# Patient Record
Sex: FEMALE | Race: WHITE | Employment: OTHER | URBAN - METROPOLITAN AREA
[De-identification: names, ages, dates, MRNs, and addresses within clinical notes are randomized per-mention and may not be internally consistent; named-entity substitution may affect disease eponyms.]

---

## 2017-10-19 ENCOUNTER — HOSPITAL ENCOUNTER (OUTPATIENT)
Dept: MAMMOGRAPHY | Age: 67
Discharge: HOME | End: 2017-10-19
Attending: FAMILY MEDICINE
Payer: MEDICARE

## 2017-10-19 DIAGNOSIS — Z12.31 VISIT FOR SCREENING MAMMOGRAM: ICD-10-CM

## 2017-10-19 PROCEDURE — 77063 BREAST TOMOSYNTHESIS BI: CPT

## 2018-11-03 ENCOUNTER — HOSPITAL ENCOUNTER (OUTPATIENT)
Dept: MAMMOGRAPHY | Age: 68
Discharge: HOME | End: 2018-11-03
Attending: FAMILY MEDICINE
Payer: MEDICARE

## 2018-11-03 DIAGNOSIS — Z12.31 VISIT FOR SCREENING MAMMOGRAM: ICD-10-CM

## 2018-11-03 PROCEDURE — 77063 BREAST TOMOSYNTHESIS BI: CPT

## 2018-11-07 ENCOUNTER — HOSPITAL ENCOUNTER (OUTPATIENT)
Dept: MAMMOGRAPHY | Age: 68
Discharge: HOME | End: 2018-11-07
Attending: FAMILY MEDICINE
Payer: MEDICARE

## 2018-11-07 DIAGNOSIS — R92.8 ABNORMAL SCREENING MAMMOGRAM: ICD-10-CM

## 2018-11-07 PROCEDURE — 76642 ULTRASOUND BREAST LIMITED: CPT

## 2019-12-12 ENCOUNTER — HOSPITAL ENCOUNTER (OUTPATIENT)
Dept: PHYSICAL THERAPY | Age: 69
Discharge: HOME | End: 2019-12-12
Payer: MEDICARE

## 2019-12-12 DIAGNOSIS — M54.32 BILATERAL SCIATICA: ICD-10-CM

## 2019-12-12 DIAGNOSIS — M54.31 BILATERAL SCIATICA: ICD-10-CM

## 2019-12-12 PROCEDURE — 97140 MANUAL THERAPY 1/> REGIONS: CPT

## 2019-12-12 PROCEDURE — 97161 PT EVAL LOW COMPLEX 20 MIN: CPT

## 2019-12-12 PROCEDURE — 97110 THERAPEUTIC EXERCISES: CPT

## 2019-12-12 NOTE — THERAPY EVALUATION
Bob Navarro  : 1950  [place]  Secondary: [address] [facility] at [address], [place], [address] [place] [address]  [phone]   [phone]       OUTPATIENT PHYSICAL THERAPY:Initial Assessment 2019    ICD-10: Treatment Diagnosis: low back pain (M54.5)                Treatment Diagnosis 2: sciatica, right (M54.31)                Treatment Diagnosis 3: sciatica, left (M54.32)  Precautions: numbness and L LE weakness  Allergies: Latex; Ciprofloxacin; and Pcn [penicillins]   TREATMENT PLAN:  Effective Dates: 2019 TO 2/10/2020 (60 days). Frequency/Duration: 1-2 times a week for 60 Day(s) MEDICAL/REFERRING DIAGNOSIS:  Bilateral sciatica [M54.31, M54.32]   DATE OF ONSET: chronic back pain which has been intermittent over quite a few years. L LE pain and worsened low back pain over the last year. REFERRING PHYSICIAN: Ale Mares MD MD Orders: Evaluate and Treat   Return MD Appointment: after physical therapy     INITIAL ASSESSMENT:  Ms. Charley Mayen is a 78 y.o. female presenting to physical therapy with complaints of chronic low back pain which has worsened over the last year. She reports intermittent low back pain for years, but now having L LE pain and R thigh burning since hiking [place]. She was having pain into her L foot until about 2 weeks ago and now reports pain just to her knee. Patient rates her pain 7/10 currently, up to 10/10 at worst with increased activities, and 2/10 at best when not doing much or resting. When her pain is intense she is able to lay down on her back for about 30-40 minutes to get it to relieve some. Patient with difficulty with inclines and stairs. She is eager to return to her prior level of activities, including camping and hiking with her family.  Patient presents with increased pain, decreased strength, decreased ROM, decreased flexibility, impaired gait, impaired posture, impaired transfer ability, decreased activity tolerance, and overall impaired functional mobility. Patient is a good candidate for skilled physical therapy interventions to include manual therapy, therapeutic exercise, balance training, gait training, transfer training, postural re-education, body mechanics training, and pain modalities as needed. PROBLEM LIST (Impacting functional limitations):  1. Decreased Strength  2. Decreased ADL/Functional Activities  3. Decreased Transfer Abilities  4. Decreased Ambulation Ability/Technique  5. Decreased Balance  6. Increased Pain  7. Decreased Activity Tolerance  8. Decreased Pacing Skills  9. Decreased Work Simplification/Energy Conservation Techniques  10. Decreased Flexibility/Joint Mobility INTERVENTIONS PLANNED: (Treatment may consist of any combination of the following)  1. Balance Exercise  2. Bed Mobility  3. Cold  4. Electrical Stimulation  5. Family Education  6. Gait Training  7. Heat  8. Home Exercise Program (HEP)  9. Manual Therapy  10. Neuromuscular Re-education/Strengthening  11. Range of Motion (ROM)  12. Therapeutic Activites  13. Therapeutic Exercise/Strengthening  14. Transfer Training  15. Ultrasound (US)     GOALS: (Goals have been discussed and agreed upon with patient.)  Short-Term Functional Goals: Time Frame: 12/12/2019 to 1/11/19  1. Patient demonstrates independence with home exercise program without verbal cueing provided by therapist.   2. Patient will report no more than 4/10 low back pain at rest in order to demonstrate improved self pain control and tolerance. 3. Patient will be educated in and demonstrate proper squat lift technique in order to decrease strain on her low back with lifting activities in her home. Discharge Goals: Time Frame: 12/12/2019 to 2/10/20  1. Patient will report no more than 4/10 low back pain with ADLs and transfers in order to demonstrate improving activity tolerance and mobility.   2. Patient will be able to sleep through the night without waking due to back pain in order to improve sleeping pattern for overall health and wellness. 3. Patient will be able to walk her dog and go on light hikes with her family with minimal increased back pain in order to return to prior level of activities. 4. Patient will be able to perform stairs with reciprocal pattern on the way up and down in order to demonstrate improved functional LE strength. 5. Patient will improve Modified Oswestry Scale score to 13/50 from 18/50. Outcome Measure: Tool Used: Modified Oswestry Low Back Pain Questionnaire  Score:  Initial: 18/50  Most Recent: X/50 (Date: -- )   Interpretation of Score: Each section is scored on a 0-5 scale, 5 representing the greatest disability. The scores of each section are added together for a total score of 50. Medical Necessity:   · Patient is expected to demonstrate progress in strength, range of motion, balance, coordination and functional technique to improve safety during lifting, housework, and walking. · Skilled intervention continues to be required due to low back pain with L LE pain hindering return to prior level of activities. Reason for Services/Other Comments:  · Patient continues to require skilled intervention due to increased pain with decreased functional mobility. Total Evaluation Duration: 30 minutes    Rehabilitation Potential For Stated Goals: Good  Regarding Elsa Porter's therapy, I certify that the treatment plan above will be carried out by a therapist or under their direction.   Thank you for this referral,  Lotus Silveira PT    Referring Physician Signature: Elías Guardado MD              Date                     PAIN/SUBJECTIVE:    Initial: Pain Intensity 1: 7  Pain Location 1: Back, Leg  Pain Orientation 1: Lower, Left  Post Session:  3/10    HISTORY:    History of Injury/Illness (Reason for Referral):  Ms. Mayank Oconnor is a 78 y.o. female presenting to physical therapy with complaints of chronic low back pain which has worsened over the last year. She reports intermittent low back pain for years, but now having L LE pain and R thigh burning since hiking [place]. She was having pain into her L foot until about 2 weeks ago and now reports pain just to her knee. Patient rates her pain 7/10 currently, up to 10/10 at worst with increased activities, and 2/10 at best when not doing much or resting. When her pain is intense she is able to lay down on her back for about 30-40 minutes to get it to relieve some. Patient with difficulty with inclines and stairs. She is eager to return to her prior level of activities, including camping and hiking with her family. Patient presents with increased pain, decreased strength, decreased ROM, decreased flexibility, impaired gait, impaired posture, impaired transfer ability, decreased activity tolerance, and overall impaired functional mobility. Past Medical History/Comorbidities:   Ms. Elmo Bustos  has a past medical history of H/O seasonal allergies (12/19/2012), Herpes (12/19/2012), Insomnia (12/19/2012), and Osteopenia (12/19/2012). Ms. Elmo Bustos  has a past surgical history that includes hx hysterectomy (age 31); hx other surgical; hx heent; and hx breast biopsy (Right, 2016). Social History/Living Environment:     Patient lives in a private residence with her spouse  Prior Level of Function/Work/Activity:          Retired. Patient active with hiking and walking her dog. Dominant Side:         RIGHT    Ambulatory/Rehab Services H2 Model Falls Risk Assessment    Risk Factors:       (2)  Any administered antiepileptics/anticonvulsants Ability to Rise from Chair:       (0)  Ability to rise in a single movement    Falls Prevention Plan:       No modifications necessary    Total: (5 or greater = High Risk): 2    ©2010 [employer]. All Rights Reserved. [facility] Patent #[id number].  Federal Law prohibits the replication, distribution or use without written permission from [facility]    Current Medications:        Current Outpatient Medications:     meloxicam (MOBIC) 7.5 mg tablet, TAKE 1 TABLET BY MOUTH TWICE A DAY, Disp: 60 Tab, Rfl: 11    gabapentin (NEURONTIN) 100 mg capsule, Take 1 Cap by mouth nightly., Disp: 30 Cap, Rfl: 11    valACYclovir (VALTREX) 500 mg tablet, Take 1 Tab by mouth daily. , Disp: 90 Tab, Rfl: 3    cetirizine (ZYRTEC) 10 mg tablet, Take  by mouth., Disp: , Rfl:     Date Last Reviewed:  12/12/2019    Number of Personal Factors/Comorbidities that affect the Plan of Care: 0: LOW COMPLEXITY    EXAMINATION:    Patient denies any LE paresthesia. Patient denies any increase of symptoms with cough, sneeze or valsalva. Patient denies any saddle paresthesia or bowel/bladder deficits. Observation/Orthostatic Postural Assessment:          In standing, L iliac crest lower than R.   Palpation:          Lower L ASIS in supine. Moderate tightness and tenderness in L piriformis. ROM:          Lumbar ROM within functional limits  Moderate tightness bilateral hamstrings  Strength: Motion Tested Left   (*/5) Right  (*/5)   Hip Flexion 4 5   Hip Abduction 4 4+   Knee Extension 4 5   Ankle Dorsiflexion 5 5   Gross core strength 3/5 as observed with transfers     Special Tests:          Lumbar traction positive for decreased low back pain  KAROLINA negative bilaterally  SLR negative bilaterally  Slump testing negative bilaterally  Passive Accessory Motion:         Moderate limitation with posterior to anterior mobilization of the lower thoracic, lumbar, and sacrum. \"Feels good\" with anterior to posterior mobilization to L PSIS. Neurological Screen:              Myotomes: Key muscle strength testing through bilateral LE is [facility]. Dermatomes: Sensation to light touch for bilateral LE is intact from L1 to S2.    Reflexes: Patellar (L3/ L4): 2+ bilaterally                 Achilles (S1/ S2): 2+ bilaterally  Abnormal reflexes: Clonus: negative bilaterally  Functional Mobility:         Patient with increased back and L LE pain with walking/ hiking inclines, stairs, and housework. Nataliia Dennis to return to walking her dog, hiking, and sleeping without pain. Balance:          Sitting and standing balance intact. Body Structures Involved:  1. Nerves  2. Bones  3. Joints  4. Muscles  5. Ligaments Body Functions Affected:  1. Sensory/Pain  2. Neuromusculoskeletal  3. Movement Related Activities and Participation Affected:  1. Mobility  2. Domestic Life  3. Interpersonal Interactions and Relationships  4.  Community, Social and [place]    Number of elements (examined above) that affect the Plan of Care: 1-2: LOW COMPLEXITY    CLINICAL PRESENTATION:    Presentation: Stable and uncomplicated: LOW COMPLEXITY    CLINICAL DECISION MAKING:    Use of outcome tool(s) and clinical judgement create a POC that gives a: Clear prediction of patient's progress: LOW COMPLEXITY

## 2019-12-12 NOTE — PROGRESS NOTES
Cassidy Hayes  : 1950  [place]  Secondary: [address], [address]  [phone]   [phone]      OUTPATIENT PHYSICAL THERAPY: Daily Treatment Note 2019    ICD-10: Treatment Diagnosis: low back pain (M54.5)                Treatment Diagnosis 2: sciatica, right (M54.31)                Treatment Diagnosis 3: sciatica, left (M54.32)  Precautions: numbness and L LE weakness  Allergies: Latex; Ciprofloxacin; and Pcn [penicillins]  TREATMENT PLAN:  Effective Dates: 2019 TO 2/10/2020 (60 days). Frequency/Duration: 1-2 times a week for 60 Day(s) MEDICAL/REFERRING DIAGNOSIS:  Bilateral sciatica [M54.31, M54.32]   DATE OF ONSET: chronic back pain which has been intermittent over quite a few years. L LE pain and worsened low back pain over the last year. REFERRING PHYSICIAN: Clau Lainez MD MD Orders: Evaluate and Treat   Return MD Appointment: after physical therapy     Pre-treatment Symptoms/Complaints:  Patient reports chronic low back pain for quite a few years, but increased L LE and back pain in the last year. Pain: Initial: Pain Intensity 1: 7  Pain Location 1: Back, Leg  Pain Orientation 1: Lower, Left  Post Session:  3/10   Medications Last Reviewed:  2019  Updated Objective Findings:  See evaluation note from today   TREATMENT:   THERAPEUTIC EXERCISE: (15 minutes):  Exercises per grid below to improve mobility, strength, balance and coordination. Required moderate visual, verbal and manual cues to promote proper body alignment, promote proper body posture and promote proper body mechanics. Progressed resistance, range, repetitions and complexity of movement as indicated.      Date:  2019 Date:   Date:     Activity/Exercise Parameters Parameters Parameters   Hamstring stretch X 2     Piriformis stretch X 2     SKTC X 2     DKTC X 2 Time spent with patient reviewing proper muscle recruitment and technique with exercises. MANUAL THERAPY: (10 minutes): Joint mobilization and Soft tissue mobilization was utilized and necessary because of the patient's restricted joint motion, painful spasm, loss of articular motion and restricted motion of soft tissue   Supine intermittent lumbar traction to decreased pain and LE symptoms    MODALITIES: (0 minutes):      None today     HEP: As above; handouts given to patient for all exercises. Treatment/Session Summary:    · Response to Treatment:  patient with decreased pain at end of treatment today. Good review of streching and patient expressed understanding of HEP. · Communication/Consultation:  Reviewed HEP, safety with housework, and body mechanics  · Equipment provided today:  Patient given printout of above exercises for home program  · Recommendations/Intent for next treatment session: Next visit will focus on manual therapy and modalities for pain control, LE stretching, core and LE strengthening as tolerated.     Total Treatment Billable Duration:  55 minutes: 30 evaluation, 15 manual, 10 therapeutic exercise  PT Patient Time In/Time Out  Time In: 1430  Time Out: 1530  Latoya Chahal, PT

## 2019-12-16 ENCOUNTER — HOSPITAL ENCOUNTER (OUTPATIENT)
Dept: PHYSICAL THERAPY | Age: 69
Discharge: HOME | End: 2019-12-16
Payer: MEDICARE

## 2019-12-16 PROCEDURE — 97140 MANUAL THERAPY 1/> REGIONS: CPT

## 2019-12-16 PROCEDURE — 97110 THERAPEUTIC EXERCISES: CPT

## 2019-12-16 NOTE — PROGRESS NOTES
Yarely Comment  : 1950  [place]  Secondary: [address], [address]  [phone]   [phone]      OUTPATIENT PHYSICAL THERAPY: Daily Treatment Note 2019    ICD-10: Treatment Diagnosis: low back pain (M54.5)                Treatment Diagnosis 2: sciatica, right (M54.31)                Treatment Diagnosis 3: sciatica, left (M54.32)  Precautions: numbness and L LE weakness  Allergies: Latex; Ciprofloxacin; and Pcn [penicillins]  TREATMENT PLAN:  Effective Dates: 2019 TO 2/10/2020 (60 days). Frequency/Duration: 1-2 times a week for 60 Day(s) MEDICAL/REFERRING DIAGNOSIS:  Sciatica, right side [M54.31]  Sciatica, left side [M54.32]   DATE OF ONSET: chronic back pain which has been intermittent over quite a few years. L LE pain and worsened low back pain over the last year. REFERRING PHYSICIAN: Moe Banda MD MD Orders: Evaluate and Treat   Return MD Appointment: after physical therapy     Pre-treatment Symptoms/Complaints:  Patient reported less pain but think she might have over done it with stretches over the weekend    Pain: Initial: Pain Intensity 1: 4  Pain Location 1: Back, Leg  Pain Orientation 1: Lower, Left  Post Session:  2/10   Medications Last Reviewed:  2019  Updated Objective Findings:  Pt. presented minimal antalgic gait pattern today. TREATMENT:   THERAPEUTIC EXERCISE: (40 minutes):  Exercises per grid below to improve mobility, strength, balance and coordination. Required moderate visual, verbal and manual cues to promote proper body alignment, promote proper body posture and promote proper body mechanics. Progressed resistance, range, repetitions and complexity of movement as indicated.      Date:  2019 Date:  19 Date:     Activity/Exercise Parameters Parameters Parameters   Hamstring stretch X 2 4x30 sec hold     Piriformis stretch X 2 4x30 sec hold BLE's     SKTC X 2 4x30 sec hold BLE's    DKTC X 2 4x30 sec hold BLE's     I T band stretch   4x30 sec hold each     Nu step   Level 4 x 10 mins     Calf stretch  4x30 sec hold on incline    Trunk rotation  X 10 reps x 10 sec hold each    Pelvic tilts  X 20 reps 5 sec hold                   Time spent with patient reviewing proper muscle recruitment and technique with exercises. MANUAL THERAPY: (15 minutes): Joint mobilization and Soft tissue mobilization was utilized and necessary because of the patient's restricted joint motion, painful spasm, loss of articular motion and restricted motion of soft tissue   Supine intermittent lumbar traction to decreased pain and LE symptoms   Soft tissue mobilization to decrease pain and tightness in bilateral lumbosacral paraspinals     MODALITIES: (10 minutes):      Pt. received ice pack to bilateral lumbosacral paraspinals to decrease pain      HEP: As above; handouts given to patient for all exercises. Treatment/Session Summary:    · Response to Treatment:  Pt. was compliant with all exercises and reported less pain. .  · Communication/Consultation:  Reviewed HEP, safety with housework, and body mechanics  · Equipment provided today:  Patient given printout of above exercises for home program  · Recommendations/Intent for next treatment session: Next visit will focus on manual therapy and modalities for pain control, LE stretching, core and LE strengthening as tolerated.     Total Treatment Billable Duration:  55 mins  PT Patient Time In/Time Out  Time In: 0900  Time Out: [facility]

## 2019-12-20 ENCOUNTER — HOSPITAL ENCOUNTER (OUTPATIENT)
Dept: PHYSICAL THERAPY | Age: 69
Discharge: HOME | End: 2019-12-20
Payer: MEDICARE

## 2019-12-20 PROCEDURE — 97140 MANUAL THERAPY 1/> REGIONS: CPT

## 2019-12-20 PROCEDURE — 97110 THERAPEUTIC EXERCISES: CPT

## 2019-12-20 NOTE — PROGRESS NOTES
Pete Bryant  : 1950  Primary:   Secondary:  [address] at [address], [address] [place] [address], [address] [place] [address]  [phone]   [phone]      OUTPATIENT PHYSICAL THERAPY: Daily Treatment Note 2019    ICD-10: Treatment Diagnosis: low back pain (M54.5)                Treatment Diagnosis 2: sciatica, right (M54.31)                Treatment Diagnosis 3: sciatica, left (M54.32)  Precautions: numbness and L LE weakness  Allergies: Latex; Ciprofloxacin; and Pcn [penicillins]  TREATMENT PLAN:  Effective Dates: 2019 TO 2/10/2020 (60 days). Frequency/Duration: 1-2 times a week for 60 Day(s) MEDICAL/REFERRING DIAGNOSIS:  Sciatica, right side [M54.31]  Sciatica, left side [M54.32]   DATE OF ONSET: chronic back pain which has been intermittent over quite a few years. L LE pain and worsened low back pain over the last year. REFERRING PHYSICIAN: Jose Herrera MD MD Orders: Evaluate and Treat   Return MD Appointment: after physical therapy     Pre-treatment Symptoms/Complaints:  Patient stated she knows she is over stretching and has eased off and rested more. Pain: Initial: Pain Intensity 1: 1  Pain Location 1: Back, Leg  Pain Orientation 1: Left, Right  Post Session:  0/10 pain free   Medications Last Reviewed:  2019  Updated Objective Findings:  Pt. demonstrated no antalgic gait today. TREATMENT:   THERAPEUTIC EXERCISE: (40 minutes):  Exercises per grid below to improve mobility, strength, balance and coordination. Required moderate visual, verbal and manual cues to promote proper body alignment, promote proper body posture and promote proper body mechanics. Progressed resistance, range, repetitions and complexity of movement as indicated.      Date:  2019 Date:  19 Date:  19   Activity/Exercise Parameters Parameters Parameters   Hamstring stretch X 2 4x30 sec hold  4x30 sec hold    Piriformis stretch X 2 4x30 sec hold BLE's  4x30 sec hold    SKTC X 2 4x30 sec hold BLE's 4x30 sec hold    DKTC X 2 4x30 sec hold BLE's  4x30 sec hold BLE's    I T band stretch   4x30 sec hold each  4x30 sec hold strap    Nu step   Level 4 x 10 mins  Level 4 x 10 mins    Calf stretch  4x30 sec hold on incline 4x30 sec hold on incline    Trunk rotation  X 10 reps x 10 sec hold each X 10 reps x 10 sec hold    Pelvic tilts  X 20 reps 5 sec hold  X 20 reps 5 sec hold                  Time spent with patient reviewing proper muscle recruitment and technique with exercises. MANUAL THERAPY: (15 minutes): Joint mobilization and Soft tissue mobilization was utilized and necessary because of the patient's restricted joint motion, painful spasm, loss of articular motion and restricted motion of soft tissue   Supine intermittent lumbar traction to decreased pain and LE symptoms   Soft tissue mobilization to decrease pain and tightness in bilateral lumbosacral paraspinals     MODALITIES: (10 minutes): Pt. Received moist hot pack in prone x 10 mins with pillows for support. HEP: As above; handouts given to patient for all exercises. Treatment/Session Summary:    · Response to Treatment:  Pt. was compliant with all exercises and reported less pain. .  · Communication/Consultation:  Reviewed HEP, safety with housework, and body mechanics  · Equipment provided today:  Patient given printout of above exercises for home program  · Recommendations/Intent for next treatment session: Next visit will focus on manual therapy and modalities for pain control, LE stretching, core and LE strengthening as tolerated.     Total Treatment Billable Duration:  55 mins  PT Patient Time In/Time Out  Time In: 1000  Time Out: [address] [facility]

## 2019-12-27 ENCOUNTER — HOSPITAL ENCOUNTER (OUTPATIENT)
Dept: PHYSICAL THERAPY | Age: 69
Discharge: HOME | End: 2019-12-27
Payer: MEDICARE

## 2019-12-27 ENCOUNTER — HOSPITAL ENCOUNTER (OUTPATIENT)
Dept: MAMMOGRAPHY | Age: 69
Discharge: HOME | End: 2019-12-27
Attending: FAMILY MEDICINE
Payer: MEDICARE

## 2019-12-27 DIAGNOSIS — Z12.31 VISIT FOR SCREENING MAMMOGRAM: ICD-10-CM

## 2019-12-27 PROCEDURE — 97110 THERAPEUTIC EXERCISES: CPT

## 2019-12-27 PROCEDURE — 77063 BREAST TOMOSYNTHESIS BI: CPT

## 2019-12-27 PROCEDURE — 97140 MANUAL THERAPY 1/> REGIONS: CPT

## 2019-12-27 NOTE — PROGRESS NOTES
Napoleon Crew  : 1950  Primary:   Secondary:  [address] at [facility]  [address] [facility], [place]to givens, [address] [place] [address]  [phone]   [phone]      OUTPATIENT PHYSICAL THERAPY: Daily Treatment Note 2019    ICD-10: Treatment Diagnosis: low back pain (M54.5)                Treatment Diagnosis 2: sciatica, right (M54.31)                Treatment Diagnosis 3: sciatica, left (M54.32)  Precautions: numbness and L LE weakness  Allergies: Latex; Ciprofloxacin; and Pcn [penicillins]  TREATMENT PLAN:  Effective Dates: 2019 TO 2/10/2020 (60 days). Frequency/Duration: 1-2 times a week for 60 Day(s) MEDICAL/REFERRING DIAGNOSIS:  Sciatica, right side [M54.31]  Sciatica, left side [M54.32]   DATE OF ONSET: chronic back pain which has been intermittent over quite a few years. L LE pain and worsened low back pain over the last year. REFERRING PHYSICIAN: Ronnie Escoto MD MD Orders: Evaluate and Treat   Return MD Appointment: after physical therapy     Pre-treatment Symptoms/Complaints:  Patient reported over doing it with the holidays. Pain: Initial: Pain Intensity 1: 4  Pain Location 1: Back, Leg  Pain Orientation 1: Lower, Left  Post Session:  2/10    Medications Last Reviewed:  2019  Updated Objective Findings:  Pt. demonstrated minimal antalgic gait today. TREATMENT:   THERAPEUTIC EXERCISE: (40 minutes):  Exercises per grid below to improve mobility, strength, balance and coordination. Required moderate visual, verbal and manual cues to promote proper body alignment, promote proper body posture and promote proper body mechanics. Progressed resistance, range, repetitions and complexity of movement as indicated.      Date:  19 Date:  19 Date:  19   Activity/Exercise Parameters Parameters Parameters   Hamstring stretch 4x30 sec hold BLE's strap 4x30 sec hold  4x30 sec hold    Piriformis stretch 4x30 sec hold BLE's  4x30 sec hold BLE's  4x30 sec hold    SKTC 4x30 sec hold BLE's  4x30 sec hold BLE's 4x30 sec hold    DKTC 4x30 sec hold BLE's  4x30 sec hold BLE's  4x30 sec hold BLE's    I T band stretch  4x30 sec hold strap BLE's  4x30 sec hold each  4x30 sec hold strap    Nu step  Level 4 x 10 mins  Level 4 x 10 mins  Level 4 x 10 mins    Calf stretch 4x30 sec hold on incline 4x30 sec hold on incline 4x30 sec hold on incline    Trunk rotation X 10 reps x 10 sec hold each X 10 reps x 10 sec hold each X 10 reps x 10 sec hold    Pelvic tilts X 20 reps 5 sec hold  X 20 reps 5 sec hold  X 20 reps 5 sec hold    Standing hip flexion X 20 reps BLE's      Standing hip abduction  2x10 reps BLE's      Standing hamstring curls X 20 reps BLE's      Heel toe raises X 20 reps     Hip adduction  Yellow ball x 10 sec hold x 10 reps        Time spent with patient reviewing proper muscle recruitment and technique with exercises. MANUAL THERAPY: (15 minutes): Joint mobilization and Soft tissue mobilization was utilized and necessary because of the patient's restricted joint motion, painful spasm, loss of articular motion and restricted motion of soft tissue   Supine long axis distraction    Soft tissue mobilization to decrease pain and tightness in bilateral lumbosacral paraspinals     MODALITIES: (0 minutes):     HEP: As above; handouts given to patient for all exercises. Treatment/Session Summary:    · Response to Treatment:  Pt. was compliant with all exercises and reported less pain. .  · Communication/Consultation:  Reviewed HEP, safety with housework, and body mechanics  · Equipment provided today:  Patient given printout of above exercises for home program  · Recommendations/Intent for next treatment session: Next visit will focus on manual therapy and modalities for pain control, LE stretching, core and LE strengthening as tolerated.     Total Treatment Billable Duration:  55 mins  PT Patient Time In/Time Out  Time In: 1000  Time Out: [address] [place], [place]

## 2019-12-31 ENCOUNTER — HOSPITAL ENCOUNTER (OUTPATIENT)
Dept: PHYSICAL THERAPY | Age: 69
Discharge: HOME | End: 2019-12-31
Payer: MEDICARE

## 2019-12-31 PROCEDURE — 97140 MANUAL THERAPY 1/> REGIONS: CPT

## 2019-12-31 PROCEDURE — 97110 THERAPEUTIC EXERCISES: CPT

## 2019-12-31 NOTE — PROGRESS NOTES
Michelle Adames  : 1950  Primary:   Secondary:  [address] at [facility]  1900 [facility], [place], [address] [place] [address]  [phone]   [phone]      OUTPATIENT PHYSICAL THERAPY: Daily Treatment Note 2019    ICD-10: Treatment Diagnosis: low back pain (M54.5)                Treatment Diagnosis 2: sciatica, right (M54.31)                Treatment Diagnosis 3: sciatica, left (M54.32)  Precautions: numbness and L LE weakness  Allergies: Latex; Ciprofloxacin; and Pcn [penicillins]  TREATMENT PLAN:  Effective Dates: 2019 TO 2/10/2020 (60 days). Frequency/Duration: 1-2 times a week for 60 Day(s) MEDICAL/REFERRING DIAGNOSIS:  Sciatica, right side [M54.31]  Sciatica, left side [M54.32]   DATE OF ONSET: chronic back pain which has been intermittent over quite a few years. L LE pain and worsened low back pain over the last year. REFERRING PHYSICIAN: Laure Tariq MD MD Orders: Evaluate and Treat   Return MD Appointment: after physical therapy     Pre-treatment Symptoms/Complaints:  Patient reported getting around better. Pain: Initial: Pain Intensity 1: 2  Pain Location 1: Back, Leg  Pain Orientation 1: Left, Lower  Post Session:  0/10    Medications Last Reviewed:  2019  Updated Objective Findings:  Pt. ambulated into clinic with no antalgic gait today. TREATMENT:   THERAPEUTIC EXERCISE: (40 minutes):  Exercises per grid below to improve mobility, strength, balance and coordination. Required moderate visual, verbal and manual cues to promote proper body alignment, promote proper body posture and promote proper body mechanics. Progressed resistance, range, repetitions and complexity of movement as indicated.      Date:  19 Date:  19 Date:  19   Activity/Exercise Parameters Parameters Parameters   Hamstring stretch 4x30 sec hold BLE's strap 4x30 sec hold BLE's strap   4x30 sec hold    Piriformis stretch 4x30 sec hold BLE's  4x30 sec hold BLE's 4x30 sec hold    SKTC 4x30 sec hold BLE's  4x30 sec hold BLE's 4x30 sec hold    DKTC 4x30 sec hold BLE's  4x30 sec hold BLE's  4x30 sec hold BLE's    I T band stretch  4x30 sec hold strap BLE's  4x30 sec hold each  4x30 sec hold strap    Nu step  Level 4 x 10 mins  Level 4 x 10 mins  Level 4 x 10 mins    Calf stretch 4x30 sec hold on incline 4x30 sec hold on incline 4x30 sec hold on incline    Trunk rotation X 10 reps x 10 sec hold each X 10 reps x 10 sec hold each X 10 reps x 10 sec hold    Pelvic tilts X 20 reps 5 sec hold  X 20 reps 5 sec hold  X 20 reps 5 sec hold    Standing hip flexion X 20 reps BLE's  X 20 reps BLE's 2 lb wts    Standing hip abduction  2x10 reps BLE's  2x10 reps BLE's 2 lb wt. s     Standing hamstring curls X 20 reps BLE's  X 20 reps 2 lb wt. s     Heel toe raises X 20 reps X 20 reps     Hip adduction  Yellow ball x 10 sec hold x 10 reps  Yellow ball x 10 reps x 10 sec hold       Time spent with patient reviewing proper muscle recruitment and technique with exercises. MANUAL THERAPY: (15 minutes): Joint mobilization and Soft tissue mobilization was utilized and necessary because of the patient's restricted joint motion, painful spasm, loss of articular motion and restricted motion of soft tissue   Supine long axis distraction    Soft tissue mobilization to decrease pain and tightness in bilateral lumbosacral paraspinals     MODALITIES: (0 minutes):     HEP: As above; handouts given to patient for all exercises. Treatment/Session Summary:    · Response to Treatment:  Pt. continues to show progress with less pain and tightness. .  · Communication/Consultation:  Reviewed HEP, safety with housework, and body mechanics  · Equipment provided today:  Patient given printout of above exercises for home program  · Recommendations/Intent for next treatment session: Next visit will focus on manual therapy and modalities for pain control, LE stretching, core and LE strengthening as tolerated.     Total Treatment Billable Duration:  55 mins  PT Patient Time In/Time Out  Time In: 1100  Time Out: [address] [facility]

## 2020-01-07 ENCOUNTER — HOSPITAL ENCOUNTER (OUTPATIENT)
Dept: PHYSICAL THERAPY | Age: 70
Discharge: HOME | End: 2020-01-07
Payer: MEDICARE

## 2020-01-07 PROCEDURE — 97110 THERAPEUTIC EXERCISES: CPT

## 2020-01-07 PROCEDURE — 97140 MANUAL THERAPY 1/> REGIONS: CPT

## 2020-01-07 NOTE — PROGRESS NOTES
Yomaira Cabrales  : 1950  Primary:   Secondary:  [address] at [facility]  [address] [facility], [facility], [address] [place] [address]  [phone]   [phone]      OUTPATIENT PHYSICAL THERAPY: Daily Treatment Note 2020    ICD-10: Treatment Diagnosis: low back pain (M54.5)                Treatment Diagnosis 2: sciatica, right (M54.31)                Treatment Diagnosis 3: sciatica, left (M54.32)  Precautions: numbness and L LE weakness  Allergies: Latex; Ciprofloxacin; and Pcn [penicillins]  TREATMENT PLAN:  Effective Dates: 2019 TO 2/10/2020 (60 days). Frequency/Duration: 1-2 times a week for 60 Day(s) MEDICAL/REFERRING DIAGNOSIS:  Sciatica, right side [M54.31]  Sciatica, left side [M54.32]   DATE OF ONSET: chronic back pain which has been intermittent over quite a few years. L LE pain and worsened low back pain over the last year. REFERRING PHYSICIAN: Nav Granado MD MD Orders: Evaluate and Treat   Return MD Appointment: after physical therapy     Pre-treatment Symptoms/Complaints:  Patient reported getting around better. Pain: Initial: Pain Intensity 1: 2  Pain Location 1: Back, Leg  Pain Orientation 1: Left, Lower  Post Session:  1/10    Medications Last Reviewed:  2020  Updated Objective Findings:  Pt. ambulated into clinic with no antalgic gait today. TREATMENT:   THERAPEUTIC EXERCISE: (40 minutes):  Exercises per grid below to improve mobility, strength, balance and coordination. Required moderate visual, verbal and manual cues to promote proper body alignment, promote proper body posture and promote proper body mechanics. Progressed resistance, range, repetitions and complexity of movement as indicated.      Date:  19 Date:  19 Date:  20   Activity/Exercise Parameters Parameters Parameters   Hamstring stretch 4x30 sec hold BLE's strap 4x30 sec hold BLE's strap   4x30 sec hold    Piriformis stretch 4x30 sec hold BLE's  4x30 sec hold BLE's  4x30 sec hold    SKTC 4x30 sec hold BLE's  4x30 sec hold BLE's 4x30 sec hold    DKTC 4x30 sec hold BLE's  4x30 sec hold BLE's  4x30 sec hold BLE's    I T band stretch  4x30 sec hold strap BLE's  4x30 sec hold each  4x30 sec hold strap    Nu step  Level 4 x 10 mins  Level 4 x 10 mins  Level 4 x 10 mins    Calf stretch 4x30 sec hold on incline 4x30 sec hold on incline 4x30 sec hold on incline    Trunk rotation X 10 reps x 10 sec hold each X 10 reps x 10 sec hold each X 10 reps x 10 sec hold    Pelvic tilts X 20 reps 5 sec hold  X 20 reps 5 sec hold  X 20 reps 5 sec hold    Standing hip flexion X 20 reps BLE's  X 20 reps BLE's 2 lb wts X 20 reps BLE's 2 lb wt. s    Standing hip abduction  2x10 reps BLE's  2x10 reps BLE's 2 lb wt.s  2x10 reps BLE's 2 lb wt. s    Standing hamstring curls X 20 reps BLE's  X 20 reps 2 lb wt. s  X 20 reps 2 lb wt. s    Heel toe raises X 20 reps X 20 reps  X 20 reps    Hip adduction  Yellow ball x 10 sec hold x 10 reps  Yellow ball x 10 reps x 10 sec hold  Yellow ball x 10 reps x 10 sec hold      Time spent with patient reviewing proper muscle recruitment and technique with exercises. MANUAL THERAPY: (15 minutes): Joint mobilization and Soft tissue mobilization was utilized and necessary because of the patient's restricted joint motion, painful spasm, loss of articular motion and restricted motion of soft tissue   Supine long axis distraction    Soft tissue mobilization to decrease pain and tightness in bilateral lumbosacral paraspinals     MODALITIES: (10 minutes): Pt. Received ice pack to bilateral low back in prone to decrease pain. HEP: As above; handouts given to patient for all exercises. Treatment/Session Summary:    · Response to Treatment:  Pt. compliant with all exercises and reported less pain after session.    · Communication/Consultation:  Reviewed HEP, safety with housework, and body mechanics  · Equipment provided today:  Patient given printout of above exercises for home program  · Recommendations/Intent for next treatment session: Next visit will focus on manual therapy and modalities for pain control, LE stretching, core and LE strengthening as tolerated.     Total Treatment Billable Duration:  55 mins  PT Patient Time In/Time Out  Time In: 1100  Time Out: [address] [facility]

## 2020-01-10 ENCOUNTER — HOSPITAL ENCOUNTER (OUTPATIENT)
Dept: PHYSICAL THERAPY | Age: 70
Discharge: HOME | End: 2020-01-10
Payer: MEDICARE

## 2020-01-10 PROCEDURE — 97110 THERAPEUTIC EXERCISES: CPT

## 2020-01-10 PROCEDURE — 97140 MANUAL THERAPY 1/> REGIONS: CPT

## 2020-01-10 NOTE — PROGRESS NOTES
Reena Santana  : 1950  Primary:   Secondary:  [address] at [facility]  [address] [facility], [place], [address] [place] [address] Street  [phone]   [phone]      OUTPATIENT PHYSICAL THERAPY: Daily Treatment Note 1/10/2020    ICD-10: Treatment Diagnosis: low back pain (M54.5)                Treatment Diagnosis 2: sciatica, right (M54.31)                Treatment Diagnosis 3: sciatica, left (M54.32)  Precautions: numbness and L LE weakness  Allergies: Latex; Ciprofloxacin; and Pcn [penicillins]  TREATMENT PLAN:  Effective Dates: 2019 TO 2/10/2020 (60 days). Frequency/Duration: 1-2 times a week for 60 Day(s) MEDICAL/REFERRING DIAGNOSIS:  Sciatica, right side [M54.31]  Sciatica, left side [M54.32]   DATE OF ONSET: chronic back pain which has been intermittent over quite a few years. L LE pain and worsened low back pain over the last year. REFERRING PHYSICIAN: Kunal Winters MD MD Orders: Evaluate and Treat   Return MD Appointment: after physical therapy     Pre-treatment Symptoms/Complaints:  Patient reports feeling much better than when she started with no complaints of L LE pain, but still some pain in her L low back and into her tush at times. Pain: Initial: Pain Intensity 1: 2  Pain Location 1: Back  Pain Orientation 1: Lower, Left  Post Session:  1/10    Medications Last Reviewed:  1/10/2020  Updated Objective Findings:  see Progress Note from today   TREATMENT:   THERAPEUTIC EXERCISE: (30 minutes):  Exercises per grid below to improve mobility, strength, balance and coordination. Required moderate visual, verbal and manual cues to promote proper body alignment, promote proper body posture and promote proper body mechanics. Progressed resistance, range, repetitions and complexity of movement as indicated.      Date:  1/10/2020 Date:  19 Date:  20   Activity/Exercise Parameters Parameters Parameters   Hamstring stretch Strap, 3 x 30 seconds each 4x30 sec hold BLE's strap   4x30 sec hold    Piriformis stretch 3 x 30 seconds each 4x30 sec hold BLE's  4x30 sec hold    SKTC 3 x 30 seconds each 4x30 sec hold BLE's 4x30 sec hold    DKTC --- 4x30 sec hold BLE's  4x30 sec hold BLE's    I T band stretch  ---  4x30 sec hold each  4x30 sec hold strap    Nu step  Level 4 x 10 minutes  Level 4 x 10 mins  Level 4 x 10 mins    Calf stretch Slant board, 3 x 30 seconds 4x30 sec hold on incline 4x30 sec hold on incline    Trunk rotation --- X 10 reps x 10 sec hold each X 10 reps x 10 sec hold    Pelvic tilts --- X 20 reps 5 sec hold  X 20 reps 5 sec hold    Standing hip flexion --- X 20 reps BLE's 2 lb wts X 20 reps BLE's 2 lb wt. s    Standing hip abduction  Yellow loop, 2 x 10 each 2x10 reps BLE's 2 lb wt.s  2x10 reps BLE's 2 lb wt. s    Standing hamstring curls --- X 20 reps 2 lb wt. s  X 20 reps 2 lb wt. s    Heel toe raises --- X 20 reps  X 20 reps    Hip adduction  --- Yellow ball x 10 reps x 10 sec hold  Yellow ball x 10 reps x 10 sec hold    Step ups 6 inch, L LE lead, x 10  8 inch, x 5                   Time spent with patient reviewing proper muscle recruitment and technique with exercises. MANUAL THERAPY: (25 minutes): Joint mobilization and Soft tissue mobilization was utilized and necessary because of the patient's restricted joint motion, painful spasm, loss of articular motion and restricted motion of soft tissue   Supine long axis distraction    Soft tissue mobilization to decrease pain and tightness in bilateral lumbosacral paraspinals    Prone R PSIS posterior to anterior mobilizations to improve mobility and positioning   Prone R PSIS thrust with active extension and rotation for correction of posterior R innomiate   Supine lumbopelvic roll with force directed to L ASIS to correct anterior rotated innominate      MODALITIES: (0 minutes): None today    HEP: As above; handouts given to patient for all exercises.     Treatment/Session Summary:    · Response to Treatment: Improved alignment and leg length after manual therapy with decreased pain. Able to perform 6 inch step up without pain and 8 inch with minimal discomfort with cuing for gluteal squeeze  · Communication/Consultation:  Reviewed HEP and safety   · Equipment provided today:  None today  · Recommendations/Intent for next treatment session: Next visit will focus on manual therapy and modalities for pain control, LE stretching, core and LE strengthening as tolerated.     Total Treatment Billable Duration:  55 minutes  PT Patient Time In/Time Out  Time In: 1105  Time Out: [facility],

## 2020-01-10 NOTE — PROGRESS NOTES
Ana M Nicole  : 1950  [place]  Secondary: [address] [facility] at [facility]  [address] [place] [address], [place], [address] [place] [address] Street  [phone]   [phone]       OUTPATIENT PHYSICAL THERAPY:Progress Report 1/10/2020    ICD-10: Treatment Diagnosis: low back pain (M54.5)                Treatment Diagnosis 2: sciatica, right (M54.31)                Treatment Diagnosis 3: sciatica, left (M54.32)  Precautions: numbness and L LE weakness  Allergies: Latex; Ciprofloxacin; and Pcn [penicillins]   TREATMENT PLAN:  Effective Dates: 2019 TO 2/10/2020 (60 days). Frequency/Duration: 1-2 times a week for 60 Day(s) MEDICAL/REFERRING DIAGNOSIS:  Sciatica, right side [M54.31]  Sciatica, left side [M54.32]   DATE OF ONSET: chronic back pain which has been intermittent over quite a few years. L LE pain and worsened low back pain over the last year. REFERRING PHYSICIAN: Marley Poole MD MD Orders: Evaluate and Treat   Return MD Appointment: after physical therapy     INITIAL ASSESSMENT:  Ms. Fredrick Garcia is a 78 y.o. female presenting to physical therapy with complaints of chronic low back pain which has worsened over the last year. She reports intermittent low back pain for years, but now having L LE pain and R thigh burning since hiking [place]. She was having pain into her L foot until about 2 weeks ago and now reports pain just to her knee. Patient rates her pain 7/10 currently, up to 10/10 at worst with increased activities, and 2/10 at best when not doing much or resting. When her pain is intense she is able to lay down on her back for about 30-40 minutes to get it to relieve some. Patient with difficulty with inclines and stairs. She is eager to return to her prior level of activities, including camping and hiking with her family.  Patient presents with increased pain, decreased strength, decreased ROM, decreased flexibility, impaired gait, impaired posture, impaired transfer ability, decreased activity tolerance, and overall impaired functional mobility. Patient is a good candidate for skilled physical therapy interventions to include manual therapy, therapeutic exercise, balance training, gait training, transfer training, postural re-education, body mechanics training, and pain modalities as needed. PROGRESS NOTE 1/10/2020: Patient has been seen for 7 sessions of physical therapy from 12/12/19 to 1/9/20. She reports feeling much better since starting therapy, feeling less stiff, the pain in her leg is mostly gone, getting up and down out of a chair better, and being able to get back to walking her dog with minimal to no pain. She continues to have trouble with ascending stairs with L LE first and with inclines while walking. She has met many of her goals and is progressing well. She should benefit from continued skilled therapy to address remaining goals and deficits in order to return to prior level of function. PROBLEM LIST (Impacting functional limitations):  1. Decreased Strength  2. Decreased ADL/Functional Activities  3. Decreased Transfer Abilities  4. Decreased Ambulation Ability/Technique  5. Decreased Balance  6. Increased Pain  7. Decreased Activity Tolerance  8. Decreased Pacing Skills  9. Decreased Work Simplification/Energy Conservation Techniques  10. Decreased Flexibility/Joint Mobility INTERVENTIONS PLANNED: (Treatment may consist of any combination of the following)  1. Balance Exercise  2. Bed Mobility  3. Cold  4. Electrical Stimulation  5. Family Education  6. Gait Training  7. Heat  8. Home Exercise Program (HEP)  9. Manual Therapy  10. Neuromuscular Re-education/Strengthening  11. Range of Motion (ROM)  12. Therapeutic Activites  13. Therapeutic Exercise/Strengthening  14. Transfer Training  15.  Ultrasound (US)     GOALS: (Goals have been discussed and agreed upon with patient.)  Short-Term Functional Goals: Time Frame: 12/12/2019 to 1/11/19  11. Patient demonstrates independence with home exercise program without verbal cueing provided by therapist. -GOAL MET  12. Patient will report no more than 4/10 low back pain at rest in order to demonstrate improved self pain control and tolerance. -GOAL MET  13. Patient will be educated in and demonstrate proper squat lift technique in order to decrease strain on her low back with lifting activities in her home. -GOAL MET  Discharge Goals: Time Frame: 12/12/2019 to 2/10/20  16. Patient will report no more than 4/10 low back pain with ADLs and transfers in order to demonstrate improving activity tolerance and mobility. -GOAL MET  17. Patient will be able to sleep through the night without waking due to back pain in order to improve sleeping pattern for overall health and wellness. -GOAL MET  18. Patient will be able to walk her dog and go on light hikes with her family with minimal increased back pain in order to return to prior level of activities. -ONGOING  19. Patient will be able to perform stairs with reciprocal pattern on the way up and down in order to demonstrate improved functional LE strength. -ONGOING  20. Patient will improve Modified Oswestry Scale score to 13/50 from 18/50. -ONGOING    Outcome Measure: Tool Used: Modified Oswestry Low Back Pain Questionnaire  Score:  Initial: 18/50  Most Recent: x/50 (Date: 1/10/2020 )   Interpretation of Score: Each section is scored on a 0-5 scale, 5 representing the greatest disability. The scores of each section are added together for a total score of 50. UPDATED OBJECTIVE FINDINGS:  Palpation:          Lower L ASIS in supine. Moderate tightness and tenderness in L piriformis. ROM:          Lumbar ROM within functional limits  Moderate tightness bilateral hamstrings  Strength:     Motion Tested Left   (*/5) Right  (*/5)   Hip Flexion 4+ (from 4) 5   Hip Abduction 4 4+   Knee Extension 4+ (from 4) 5   Ankle Dorsiflexion 5 5   Gross core strength 3/5 as observed with transfers     Passive Accessory Motion:         Moderate limitation with posterior to anterior mobilization of the lower thoracic, lumbar, and sacrum. \"Feels good\" with anterior to posterior mobilization to L PSIS. Functional Mobility:         Patient with increased back and L LE pain with walking/ hiking inclines, stairs, and housework. Lisy Crowleyers to return to walking her dog, hiking, and sleeping without pain. Medical Necessity:   · Patient is expected to demonstrate progress in strength, range of motion, balance, coordination and functional technique to improve safety during lifting, housework, and walking. · Skilled intervention continues to be required due to low back pain with L LE pain hindering return to prior level of activities. Reason for Services/Other Comments:  · Patient continues to require skilled intervention due to increased pain with decreased functional mobility. Rehabilitation Potential For Stated Goals: Good  Regarding Johana Porter's therapy, I certify that the treatment plan above will be carried out by a therapist or under their direction.   Thank you for this referral,  Delaney Harrison, PT

## 2020-01-14 ENCOUNTER — HOSPITAL ENCOUNTER (OUTPATIENT)
Dept: PHYSICAL THERAPY | Age: 70
Discharge: HOME | End: 2020-01-14
Payer: MEDICARE

## 2020-01-14 PROCEDURE — 97110 THERAPEUTIC EXERCISES: CPT

## 2020-01-14 PROCEDURE — 97140 MANUAL THERAPY 1/> REGIONS: CPT

## 2020-01-14 NOTE — PROGRESS NOTES
Brenda Givens  : 1950  Primary:   Secondary:  [address] at [facility]  [address] [facility], [address] [place] [address] Street  [phone]   [phone]      OUTPATIENT PHYSICAL THERAPY: Daily Treatment Note 2020    ICD-10: Treatment Diagnosis: low back pain (M54.5)                Treatment Diagnosis 2: sciatica, right (M54.31)                Treatment Diagnosis 3: sciatica, left (M54.32)  Precautions: numbness and L LE weakness  Allergies: Latex; Ciprofloxacin; and Pcn [penicillins]  TREATMENT PLAN:  Effective Dates: 2019 TO 2/10/2020 (60 days). Frequency/Duration: 1-2 times a week for 60 Day(s) MEDICAL/REFERRING DIAGNOSIS:  Sciatica, right side [M54.31]  Sciatica, left side [M54.32]   DATE OF ONSET: chronic back pain which has been intermittent over quite a few years. L LE pain and worsened low back pain over the last year. REFERRING PHYSICIAN: Franco Martin MD MD Orders: Evaluate and Treat   Return MD Appointment: after physical therapy     Pre-treatment Symptoms/Complaints:  Patient reported minimal aching and some burning discomfort that is intermittent. Pain: Initial: Pain Intensity 1: 2  Pain Location 1: Back  Pain Orientation 1: Lower, Left, Right  Post Session:  1/10 minimal pain    Medications Last Reviewed:  2020  Updated Objective Findings:  no antalgic gait today. TREATMENT:   THERAPEUTIC EXERCISE: (40 minutes):  Exercises per grid below to improve mobility, strength, balance and coordination. Required moderate visual, verbal and manual cues to promote proper body alignment, promote proper body posture and promote proper body mechanics. Progressed resistance, range, repetitions and complexity of movement as indicated.      Date:  1/10/2020 Date:  20 Date:  20   Activity/Exercise Parameters Parameters Parameters   Hamstring stretch Strap, 3 x 30 seconds each 4x30 sec hold BLE's strap   4x30 sec hold    Piriformis stretch 3 x 30 seconds each 4x30 sec hold BLE's  4x30 sec hold    SKTC 3 x 30 seconds each 4x30 sec hold BLE's 4x30 sec hold    DKTC --- 4x30 sec hold BLE's  4x30 sec hold BLE's    I T band stretch  ---  4x30 sec hold each  4x30 sec hold strap    Nu step  Level 4 x 10 minutes  Level 4 x 10 mins  Level 4 x 10 mins    Calf stretch Slant board, 3 x 30 seconds 4x30 sec hold on incline 4x30 sec hold on incline    Trunk rotation --- X 10 reps x 10 sec hold each X 10 reps x 10 sec hold    Pelvic tilts --- X 20 reps 5 sec hold  X 20 reps 5 sec hold    Standing hip flexion --- X 20 reps BLE's 2 lb wts X 20 reps BLE's 2 lb wt. s    Standing hip abduction  Yellow loop, 2 x 10 each 2x10 reps BLE's 2 lb wt.s  2x10 reps BLE's 2 lb wt. s    Standing hamstring curls --- X 20 reps 2 lb wt. s  X 20 reps 2 lb wt. s    Heel toe raises --- X 20 reps  X 20 reps    Hip adduction  --- Yellow ball x 10 reps x 10 sec hold  Yellow ball x 10 reps x 10 sec hold    Step ups 6 inch, L LE lead, x 10  8 inch, x 5 8 inch x 10 reps LLE lead fwd & lateral each                  Time spent with patient reviewing proper muscle recruitment and technique with exercises. MANUAL THERAPY: (15 minutes): Joint mobilization and Soft tissue mobilization was utilized and necessary because of the patient's restricted joint motion, painful spasm, loss of articular motion and restricted motion of soft tissue   Supine long axis distraction    Soft tissue mobilization to decrease pain and tightness in bilateral lumbosacral paraspinals       MODALITIES: (8 minutes):  Pt. Received ice pack in prone at the end of session to decrease pain and edema. HEP: As above; handouts given to patient for all exercises.     Treatment/Session Summary:    · Response to Treatment:  Pt. compliant with all exercises and reported less pain  · Communication/Consultation:  Reviewed HEP and safety   · Equipment provided today:  None today  · Recommendations/Intent for next treatment session: Next visit will focus on manual therapy and modalities for pain control, LE stretching, core and LE strengthening as tolerated.     Total Treatment Billable Duration:  55 minutes  PT Patient Time In/Time Out  Time In: 1055  Time Out: 3200 [facility]

## 2020-01-17 ENCOUNTER — HOSPITAL ENCOUNTER (OUTPATIENT)
Dept: PHYSICAL THERAPY | Age: 70
Discharge: HOME | End: 2020-01-17
Payer: MEDICARE

## 2020-01-17 PROCEDURE — 97140 MANUAL THERAPY 1/> REGIONS: CPT

## 2020-01-17 PROCEDURE — 97110 THERAPEUTIC EXERCISES: CPT

## 2020-01-17 NOTE — PROGRESS NOTES
Zeferino Montiel  : 1950  Primary:   Secondary:  [address] at [facility]  [address] [place] [address], [place], [address] [place] [address]  [phone]   [phone]      OUTPATIENT PHYSICAL THERAPY: Daily Treatment Note 2020    ICD-10: Treatment Diagnosis: low back pain (M54.5)                Treatment Diagnosis 2: sciatica, right (M54.31)                Treatment Diagnosis 3: sciatica, left (M54.32)  Precautions: numbness and L LE weakness  Allergies: Latex; Ciprofloxacin; and Pcn [penicillins]  TREATMENT PLAN:  Effective Dates: 2019 TO 2/10/2020 (60 days). Frequency/Duration: 1-2 times a week for 60 Day(s) MEDICAL/REFERRING DIAGNOSIS:  Sciatica, right side [M54.31]  Sciatica, left side [M54.32]   DATE OF ONSET: chronic back pain which has been intermittent over quite a few years. L LE pain and worsened low back pain over the last year. REFERRING PHYSICIAN: Eldon Bosworth, MD MD Orders: Evaluate and Treat   Return MD Appointment: after physical therapy     Pre-treatment Symptoms/Complaints:  Patient reported mainly hurting in left lower back stiffness primarily. Pain: Initial: Pain Intensity 1: 3  Pain Location 1: Back  Pain Orientation 1: Lower, Left  Post Session:  1/10 minimal pain    Medications Last Reviewed:  2020  Updated Objective Findings:  Pt. presented stiffness in left lower back   TREATMENT:   THERAPEUTIC EXERCISE: (40 minutes):  Exercises per grid below to improve mobility, strength, balance and coordination. Required moderate visual, verbal and manual cues to promote proper body alignment, promote proper body posture and promote proper body mechanics. Progressed resistance, range, repetitions and complexity of movement as indicated.      Date:  1/10/2020 Date:  20 Date:  20   Activity/Exercise Parameters Parameters Parameters   Hamstring stretch Strap, 3 x 30 seconds each 4x30 sec hold BLE's strap   4x30 sec hold    Piriformis stretch 3 x 30 seconds each 4x30 sec hold BLE's  4x30 sec hold    SKTC 3 x 30 seconds each 4x30 sec hold BLE's 4x30 sec hold    DKTC --- 4x30 sec hold BLE's  4x30 sec hold BLE's    I T band stretch  ---  4x30 sec hold each  4x30 sec hold strap    Nu step  Level 4 x 10 minutes  Level 4 x 10 mins  Level 4 x 10 mins    Calf stretch Slant board, 3 x 30 seconds 4x30 sec hold on incline 4x30 sec hold on incline    Trunk rotation --- X 10 reps x 10 sec hold each X 10 reps x 10 sec hold    Pelvic tilts --- X 20 reps 5 sec hold  X 20 reps 5 sec hold    Standing hip flexion --- X 20 reps BLE's 2 lb wts X 20 reps BLE's 2.5 lb wt. s    Standing hip abduction  Yellow loop, 2 x 10 each 2x10 reps BLE's 2 lb wt.s  2x10 reps BLE's 2.5 lb wt. s    Standing hamstring curls --- X 20 reps 2 lb wt. s  X 20 reps 2.5 lb wt. s    Heel toe raises --- X 20 reps  X 20 reps    Hip adduction  --- Yellow ball x 10 reps x 10 sec hold  Yellow ball x 10 reps x 10 sec hold    Step ups 6 inch, L LE lead, x 10  8 inch, x 5 8 inch x 10 reps LLE lead fwd & lateral each 8 inch x 20 reps LLE lead fwd & lateral each                  Time spent with patient reviewing proper muscle recruitment and technique with exercises. MANUAL THERAPY: (15 minutes): Joint mobilization and Soft tissue mobilization was utilized and necessary because of the patient's restricted joint motion, painful spasm, loss of articular motion and restricted motion of soft tissue   Supine long axis distraction    Soft tissue mobilization to decrease pain and tightness in bilateral lumbosacral paraspinals       MODALITIES: (8 minutes):  Pt. Received ice pack in prone at the end of session to decrease pain and edema. HEP: As above; handouts given to patient for all exercises.     Treatment/Session Summary:    · Response to Treatment:  Pt. compliant with all exercises and reported less pain  · Communication/Consultation:  Reviewed HEP and safety   · Equipment provided today:  None today  · Recommendations/Intent for next treatment session: Next visit will focus on manual therapy and modalities for pain control, LE stretching, core and LE strengthening as tolerated.     Total Treatment Billable Duration:  55 minutes  PT Patient Time In/Time Out  Time In: 1055  Time Out: 3200 [facility]

## 2020-01-21 ENCOUNTER — HOSPITAL ENCOUNTER (OUTPATIENT)
Dept: PHYSICAL THERAPY | Age: 70
Discharge: HOME | End: 2020-01-21
Payer: MEDICARE

## 2020-01-21 PROCEDURE — 97110 THERAPEUTIC EXERCISES: CPT

## 2020-01-21 PROCEDURE — 97140 MANUAL THERAPY 1/> REGIONS: CPT

## 2020-01-21 NOTE — PROGRESS NOTES
April Chain  : 1950  Primary:   Secondary:  [address] at [address], Adalberto givens, [address] [place] [address]  [phone]   [phone]      OUTPATIENT PHYSICAL THERAPY: Daily Treatment Note 2020    ICD-10: Treatment Diagnosis: low back pain (M54.5)                Treatment Diagnosis 2: sciatica, right (M54.31)                Treatment Diagnosis 3: sciatica, left (M54.32)  Precautions: numbness and L LE weakness  Allergies: Latex; Ciprofloxacin; and Pcn [penicillins]  TREATMENT PLAN:  Effective Dates: 2019 TO 2/10/2020 (60 days). Frequency/Duration: 1-2 times a week for 60 Day(s) MEDICAL/REFERRING DIAGNOSIS:  Sciatica, right side [M54.31]  Sciatica, left side [M54.32]   DATE OF ONSET: chronic back pain which has been intermittent over quite a few years. L LE pain and worsened low back pain over the last year. REFERRING PHYSICIAN: Nic Huizar MD MD Orders: Evaluate and Treat   Return MD Appointment: after physical therapy     Pre-treatment Symptoms/Complaints:  Patient reported she is now limiting herself to not overdoing and if something hurts she does not do it. Pain: Initial: Pain Intensity 1: 1  Pain Location 1: Back  Pain Orientation 1: Lower, Left  Post Session:  1/10    Medications Last Reviewed:  2020  Updated Objective Findings:  Pt. had no antalgic gait today   TREATMENT:   THERAPEUTIC EXERCISE: (40 minutes):  Exercises per grid below to improve mobility, strength, balance and coordination. Required moderate visual, verbal and manual cues to promote proper body alignment, promote proper body posture and promote proper body mechanics. Progressed resistance, range, repetitions and complexity of movement as indicated.      Date:  2020 Date:  20 Date:  20   Activity/Exercise Parameters Parameters Parameters   Hamstring stretch Strap  4  x 30 seconds each 4x30 sec hold BLE's strap   4x30 sec hold    Piriformis stretch 4 x 30 seconds each 4x30 sec hold BLE's  4x30 sec hold    SKTC 4 x 30 seconds each 4x30 sec hold BLE's 4x30 sec hold    DKTC 4x30 sec hold BLE's 4x30 sec hold BLE's  4x30 sec hold BLE's    I T band stretch  4x30 sec hold with strap 4x30 sec hold each  4x30 sec hold strap    Nu step  Level 5 x 10 minutes  Level 4 x 10 mins  Level 4 x 10 mins    Calf stretch Slant board, 4 x 30 seconds 4x30 sec hold on incline 4x30 sec hold on incline    Trunk rotation X 10 reps x 10 sec hold  X 10 reps x 10 sec hold each X 10 reps x 10 sec hold    Pelvic tilts 20 reps 5 sec hold  X 20 reps 5 sec hold  X 20 reps 5 sec hold    Standing hip flexion X 20 reps BLE's 2.5 lb wt. s  X 20 reps BLE's 2 lb wts X 20 reps BLE's 2.5 lb wt. s    Standing hip abduction  2.5 lb wt.s 2 x 10 each LE  2x10 reps BLE's 2 lb wt.s  2x10 reps BLE's 2.5 lb wt. s    Standing hamstring curls X 20 reps 2.5 lb wt. s X 20 reps 2 lb wt. s  X 20 reps 2.5 lb wt. s    Heel toe raises X 20 reps  X 20 reps  X 20 reps    Hip adduction  Yellow ball x 10 reps x 10 sec hold  Yellow ball x 10 reps x 10 sec hold  Yellow ball x 10 reps x 10 sec hold    Step ups 8 inch, L LE lead, x 10  8 inch, x 5 8 inch x 10 reps LLE lead fwd & lateral each 8 inch x 20 reps LLE lead fwd & lateral each                  Time spent with patient reviewing proper muscle recruitment and technique with exercises. MANUAL THERAPY: (15 minutes): Joint mobilization and Soft tissue mobilization was utilized and necessary because of the patient's restricted joint motion, painful spasm, loss of articular motion and restricted motion of soft tissue   Supine long axis distraction    Soft tissue mobilization to decrease pain and tightness in bilateral lumbosacral paraspinals       MODALITIES: (0 minutes):  None today    HEP: As above; handouts given to patient for all exercises. Treatment/Session Summary:    · Response to Treatment:  Pt.  Was compliant with all exercises and remained 1/10 pain level.  · Communication/Consultation:  Reviewed HEP and safety   · Equipment provided today:  None today  · Recommendations/Intent for next treatment session: Next visit will focus on manual therapy and modalities for pain control, LE stretching, core and LE strengthening as tolerated.     Total Treatment Billable Duration:  55 minutes  PT Patient Time In/Time Out  Time In: 1100  Time Out: 3200 [facility]

## 2020-01-24 ENCOUNTER — HOSPITAL ENCOUNTER (OUTPATIENT)
Dept: PHYSICAL THERAPY | Age: 70
Discharge: HOME | End: 2020-01-24
Payer: MEDICARE

## 2020-01-24 PROCEDURE — 97110 THERAPEUTIC EXERCISES: CPT

## 2020-01-24 PROCEDURE — 97140 MANUAL THERAPY 1/> REGIONS: CPT

## 2020-01-24 NOTE — PROGRESS NOTES
Zeferino Montiel  : 1950  Primary:   Secondary:  [address] at [place] [id number] [facility], [address] [place] [address] Street  [phone]   [phone]      OUTPATIENT PHYSICAL THERAPY: Daily Treatment Note 2020    ICD-10: Treatment Diagnosis: low back pain (M54.5)                Treatment Diagnosis 2: sciatica, right (M54.31)                Treatment Diagnosis 3: sciatica, left (M54.32)  Precautions: numbness and L LE weakness  Allergies: Latex; Ciprofloxacin; and Pcn [penicillins]  TREATMENT PLAN:  Effective Dates: 2019 TO 2/10/2020 (60 days). Frequency/Duration: 1-2 times a week for 60 Day(s) MEDICAL/REFERRING DIAGNOSIS:  Sciatica, right side [M54.31]  Sciatica, left side [M54.32]   DATE OF ONSET: chronic back pain which has been intermittent over quite a few years. L LE pain and worsened low back pain over the last year. REFERRING PHYSICIAN: Eldon Bosworth, MD MD Orders: Evaluate and Treat   Return MD Appointment: after physical therapy     Pre-treatment Symptoms/Complaints:  Patient reports feeling much better overall, but still having pain with ascending stairs L LE first.    Pain: Initial: Pain Intensity 1: 1  Pain Location 1: Back  Pain Orientation 1: Lower, Left  Post Session:  1/10    Medications Last Reviewed:  2020  Updated Objective Findings:  L hip drop with step ups- improved with cuing   TREATMENT:   THERAPEUTIC EXERCISE: (40 minutes):  Exercises per grid below to improve mobility, strength, balance and coordination. Required moderate visual, verbal and manual cues to promote proper body alignment, promote proper body posture and promote proper body mechanics. Progressed resistance, range, repetitions and complexity of movement as indicated.      Date:  2020 Date:  2020 Date:  20   Activity/Exercise Parameters Parameters Parameters   Hamstring stretch Strap  4  x 30 seconds each Strap 4  x 30 seconds each  4x30 sec hold    Piriformis stretch 4 x 30 seconds each 4 x 30 seconds each  4x30 sec hold    SKTC 4 x 30 seconds each 4 x 30 seconds each 4x30 sec hold    DKTC 4x30 sec hold BLE's --- 4x30 sec hold BLE's    I T band stretch  4x30 sec hold with strap ---  4x30 sec hold strap    Nu step  Level 5 x 10 minutes  Level 4 x 10 minutes  Level 4 x 10 mins    Calf stretch Slant board, 4 x 30 seconds Slant board, 4 x 30 seconds 4x30 sec hold on incline    Trunk rotation X 10 reps x 10 sec hold  10 x 10 seconds X 10 reps x 10 sec hold    Pelvic tilts 20 reps 5 sec hold  --- X 20 reps 5 sec hold    Standing hip flexion X 20 reps BLE's 2.5 lb wt. s  --- X 20 reps BLE's 2.5 lb wt. s    Standing hip abduction  2.5 lb wt.s 2 x 10 each LE  Yellow loop, 2 x 10 each 2x10 reps BLE's 2.5 lb wt. s    Standing hamstring curls X 20 reps 2.5 lb wt. s --- X 20 reps 2.5 lb wt. s    Heel toe raises X 20 reps  2 x 10 X 20 reps    Hip adduction  Yellow ball x 10 reps x 10 sec hold  Seated, ball, x 20 Yellow ball x 10 reps x 10 sec hold    Step ups 8 inch, L LE lead, x 10  8 inch, x 5 6 inch, 2 x 10 bilateral LE 8 inch x 20 reps LLE lead fwd & lateral each    Clamshells --- Red, 2 x 10 each    Side steps --- Yellow, 2 x 10 steps each side      Time spent with patient reviewing proper muscle recruitment and technique with exercises. MANUAL THERAPY: (15 minutes): Joint mobilization and Soft tissue mobilization was utilized and necessary because of the patient's restricted joint motion, painful spasm, loss of articular motion and restricted motion of soft tissue   Supine long axis distraction    Muscle energy technique with L LE extension and R LE flexion to correct L anterior/ R posterior innominate rotation   Shotgun technique in supine for pubic resetting      MODALITIES: (0 minutes):  None today    HEP: As above; handouts given to patient for all exercises.     Treatment/Session Summary:    · Response to Treatment:  Some soreness after exercises today, but good technique with cuing for less L hip drop with exercises. · Communication/Consultation:  Reviewed HEP and safety   · Equipment provided today:  None today  · Recommendations/Intent for next treatment session: Next visit will focus on manual therapy and modalities for pain control, LE stretching, core and LE strengthening as tolerated.     Total Treatment Billable Duration:  55 minutes  PT Patient Time In/Time Out  Time In: 1105  Time Out: 220 [facility],

## 2020-01-31 ENCOUNTER — HOSPITAL ENCOUNTER (OUTPATIENT)
Dept: MAMMOGRAPHY | Age: 70
Discharge: HOME | End: 2020-01-31
Attending: FAMILY MEDICINE
Payer: MEDICARE

## 2020-01-31 ENCOUNTER — HOSPITAL ENCOUNTER (OUTPATIENT)
Dept: PHYSICAL THERAPY | Age: 70
Discharge: HOME | End: 2020-01-31
Payer: MEDICARE

## 2020-01-31 DIAGNOSIS — M85.88 OSTEOPENIA OF LUMBAR SPINE: ICD-10-CM

## 2020-01-31 PROCEDURE — 97110 THERAPEUTIC EXERCISES: CPT

## 2020-01-31 PROCEDURE — 97140 MANUAL THERAPY 1/> REGIONS: CPT

## 2020-01-31 PROCEDURE — 77080 DXA BONE DENSITY AXIAL: CPT

## 2020-01-31 NOTE — PROGRESS NOTES
Michelle Adames  : 1950  Primary:   Secondary:  Sandy Kate at [facility]  [address] [facility], [facility], [address] [place] [address] Street  [phone]   [phone]      OUTPATIENT PHYSICAL THERAPY: Daily Treatment Note 2020    ICD-10: Treatment Diagnosis: low back pain (M54.5)                Treatment Diagnosis 2: sciatica, right (M54.31)                Treatment Diagnosis 3: sciatica, left (M54.32)  Precautions: numbness and L LE weakness  Allergies: Latex; Ciprofloxacin; and Pcn [penicillins]  TREATMENT PLAN:  Effective Dates: 2019 TO 2/10/2020 (60 days). Frequency/Duration: 1-2 times a week for 60 Day(s) MEDICAL/REFERRING DIAGNOSIS:  Sciatica, right side [M54.31]  Sciatica, left side [M54.32]   DATE OF ONSET: chronic back pain which has been intermittent over quite a few years. L LE pain and worsened low back pain over the last year. REFERRING PHYSICIAN: Laure Tariq MD MD Orders: Evaluate and Treat   Return MD Appointment: after physical therapy     Pre-treatment Symptoms/Complaints:  Patient reports being sore from going for her bone scan this morning. Pain: Initial: Pain Intensity 1: 3  Pain Location 1: Back  Pain Orientation 1: Lower, Left  Post Session:  1/10    Medications Last Reviewed:  2020  Updated Objective Findings:  Level pelvis at start of session   TREATMENT:   THERAPEUTIC EXERCISE: (40 minutes):  Exercises per grid below to improve mobility, strength, balance and coordination. Required moderate visual, verbal and manual cues to promote proper body alignment, promote proper body posture and promote proper body mechanics. Progressed resistance, range, repetitions and complexity of movement as indicated.      Date:  2020 Date:  2020 Date:  2020   Activity/Exercise Parameters Parameters Parameters   Hamstring stretch Strap  4  x 30 seconds each Strap 4  x 30 seconds each  Strap 4  x 30 seconds each    Piriformis stretch 4 x 30 seconds each 4 x 30 seconds each  4  x 30 seconds each     SKTC 4 x 30 seconds each 4 x 30 seconds each 4  x 30 seconds each    DKTC 4x30 sec hold BLE's --- ---   I T band stretch  4x30 sec hold with strap ---  ---   Nu step  Level 5 x 10 minutes  Level 4 x 10 minutes  Level 5 x 10 minutes    Calf stretch Slant board, 4 x 30 seconds Slant board, 4 x 30 seconds Slant board, 4 x 30 seconds   Trunk rotation X 10 reps x 10 sec hold  10 x 10 seconds X 10   Pelvic tilts 20 reps 5 sec hold  --- ---   Standing hip flexion X 20 reps BLE's 2.5 lb wt. s  --- ---   Standing hip abduction  2.5 lb wt.s 2 x 10 each LE  Yellow loop, 2 x 10 each ---   Standing hamstring curls X 20 reps 2.5 lb wt. s --- ---   Heel toe raises X 20 reps  2 x 10 2 x 10   Hip adduction  Yellow ball x 10 reps x 10 sec hold  Seated, ball, x 20 ---   Step ups 8 inch, L LE lead, x 10  8 inch, x 5 6 inch, 2 x 10 bilateral LE 6 inch, 2 x 10 bilateral LE   Clamshells --- Red, 2 x 10 each Red, 2 x 10 each   Side steps --- Yellow, 2 x 10 steps each side Yellow, 2 x 10 steps each side     Time spent with patient reviewing proper muscle recruitment and technique with exercises. MANUAL THERAPY: (15 minutes): Joint mobilization and Soft tissue mobilization was utilized and necessary because of the patient's restricted joint motion, painful spasm, loss of articular motion and restricted motion of soft tissue   Soft tissue mobilization with deep pressure to lumbar region and L hip/ gluteal to decrease pain and tightness   Trigger point release to L piriformis to decrease tightness and spasm      MODALITIES: (0 minutes):  None today    HEP: As above; handouts given to patient for all exercises.     Treatment/Session Summary:    · Response to Treatment:  Much less pain by end of session and able to perform step ups with minimal (1/10) pain leading with L LE.  · Communication/Consultation:  Reviewed HEP and safety   · Equipment provided today:  None today  · Recommendations/Intent for next treatment session: Next visit will focus on manual therapy and modalities for pain control, LE stretching, core and LE strengthening as tolerated.     Total Treatment Billable Duration:  55 minutes  PT Patient Time In/Time Out  Time In: 1430  Time Out: 1530  Hira Diaz PT

## 2020-02-03 NOTE — PROGRESS NOTES
Let patient know her bone density test shows osteopenia porosis. Be sure to get weightbearing exercise daily and calcium 500 mg daily. Would she consider going for injections?

## 2020-02-04 ENCOUNTER — HOSPITAL ENCOUNTER (OUTPATIENT)
Dept: PHYSICAL THERAPY | Age: 70
Discharge: HOME | End: 2020-02-04
Payer: MEDICARE

## 2020-02-04 PROCEDURE — 97140 MANUAL THERAPY 1/> REGIONS: CPT

## 2020-02-04 PROCEDURE — 97110 THERAPEUTIC EXERCISES: CPT

## 2020-02-04 NOTE — PROGRESS NOTES
Sydnie Lawton  : 1950  Primary:   Secondary:  Abdirahman Lua at [facility]  [address], [place], [address] [place] [address] Street  [phone]   [phone]      OUTPATIENT PHYSICAL THERAPY: Daily Treatment Note 2020    ICD-10: Treatment Diagnosis: low back pain (M54.5)                Treatment Diagnosis 2: sciatica, right (M54.31)                Treatment Diagnosis 3: sciatica, left (M54.32)  Precautions: numbness and L LE weakness  Allergies: Latex; Ciprofloxacin; and Pcn [penicillins]  TREATMENT PLAN:  Effective Dates: 2019 TO 2/10/2020 (60 days). Frequency/Duration: 1-2 times a week for 60 Day(s) MEDICAL/REFERRING DIAGNOSIS:  Sciatica, right side [M54.31]  Sciatica, left side [M54.32]   DATE OF ONSET: chronic back pain which has been intermittent over quite a few years. L LE pain and worsened low back pain over the last year. REFERRING PHYSICIAN: Mauro Gaucher, MD MD Orders: Evaluate and Treat   Return MD Appointment: after physical therapy     Pre-treatment Symptoms/Complaints:  Patient reported minimal discomfort today. Pain: Initial: Pain Intensity 1: 1  Post Session:   0/10    Medications Last Reviewed:  2020  Updated Objective Findings:  Less tightness and sensitivity in left piriformis   TREATMENT:   THERAPEUTIC EXERCISE: (40 minutes):  Exercises per grid below to improve mobility, strength, balance and coordination. Required moderate visual, verbal and manual cues to promote proper body alignment, promote proper body posture and promote proper body mechanics. Progressed resistance, range, repetitions and complexity of movement as indicated.      Date:  20 Date:  2020 Date:  2020   Activity/Exercise Parameters Parameters Parameters   Hamstring stretch Strap  4  x 30 seconds each Strap 4  x 30 seconds each  Strap 4  x 30 seconds each    Piriformis stretch 4 x 30 seconds each 4 x 30 seconds each  4  x 30 seconds each     SKTC 4 x 30 seconds each 4 x 30 seconds each 4  x 30 seconds each    DKTC 4x30 sec hold BLE's --- ---   I T band stretch  4x30 sec hold with strap ---  ---   Nu step  Level 5 x 10 minutes  Level 4 x 10 minutes  Level 5 x 10 minutes    Calf stretch Slant board, 4 x 30 seconds Slant board, 4 x 30 seconds Slant board, 4 x 30 seconds   Trunk rotation X 10 reps x 10 sec hold  10 x 10 seconds X 10   Pelvic tilts 20 reps 5 sec hold  --- ---   Standing hip flexion X 20 reps BLE's 2.5 lb wt. s  --- ---   Standing hip abduction  2.5 lb wt.s 2 x 10 each LE  Yellow loop, 2 x 10 each ---   Standing hamstring curls X 20 reps 2.5 lb wt. s --- ---   Heel toe raises X 20 reps  2 x 10 2 x 10   Hip adduction  Yellow ball x 10 reps x 10 sec hold  Seated, ball, x 20 ---   Step ups 8 inch, L LE lead 2x10 reps  6 inch, 2 x 10 bilateral LE 6 inch, 2 x 10 bilateral LE   Clamshells Red 2x10 reps Red, 2 x 10 each Red, 2 x 10 each   Side steps Yellow band up & down hallway twice Yellow, 2 x 10 steps each side Yellow, 2 x 10 steps each side     Time spent with patient reviewing proper muscle recruitment and technique with exercises. MANUAL THERAPY: (15 minutes): Joint mobilization and Soft tissue mobilization was utilized and necessary because of the patient's restricted joint motion, painful spasm, loss of articular motion and restricted motion of soft tissue   Soft tissue mobilization with deep pressure to lumbar region and L hip/ gluteal to decrease pain and tightness   Trigger point release to L piriformis to decrease tightness and spasm      MODALITIES: (0 minutes):  None today    HEP: As above; handouts given to patient for all exercises. Treatment/Session Summary:    · Response to Treatment: Pt. Compliant with all exercises and reported less pain and sensitivity in left piriformis.     · Communication/Consultation:  Reviewed HEP and safety   · Equipment provided today:  None today  · Recommendations/Intent for next treatment session: Next visit will focus on manual therapy and modalities for pain control, LE stretching, core and LE strengthening as tolerated.     Total Treatment Billable Duration:  55 minutes  PT Patient Time In/Time Out  Time In: 1100  Time Out: [facility]

## 2020-02-07 ENCOUNTER — HOSPITAL ENCOUNTER (OUTPATIENT)
Dept: PHYSICAL THERAPY | Age: 70
Discharge: HOME | End: 2020-02-07
Payer: MEDICARE

## 2020-02-07 PROCEDURE — 97110 THERAPEUTIC EXERCISES: CPT

## 2020-02-07 NOTE — PROGRESS NOTES
Sekou Michelle  : 1950  Primary:   Secondary:  [address] at [facility]  [address] [place] [address], [facility], [address] [place] [address]  [phone]   [phone]      OUTPATIENT PHYSICAL THERAPY: Daily Treatment Note 2020    ICD-10: Treatment Diagnosis: low back pain (M54.5)                Treatment Diagnosis 2: sciatica, right (M54.31)                Treatment Diagnosis 3: sciatica, left (M54.32)  Precautions: numbness and L LE weakness  Allergies: Latex; Ciprofloxacin; and Pcn [penicillins]  TREATMENT PLAN:  Effective Dates: 2019 TO 2/10/2020 (60 days). Frequency/Duration: 1-2 times a week for 60 Day(s) MEDICAL/REFERRING DIAGNOSIS:  Sciatica, right side [M54.31]  Sciatica, left side [M54.32]   DATE OF ONSET: chronic back pain which has been intermittent over quite a few years. L LE pain and worsened low back pain over the last year. REFERRING PHYSICIAN: Pramod Hanson MD MD Orders: Evaluate and Treat   Return MD Appointment: after physical therapy     Pre-treatment Symptoms/Complaints:  Patient reports feeling much better with therapy and know what to do/ not do. Pain: Initial: Pain Intensity 1: 2  Pain Location 1: Leg  Pain Orientation 1: Left  Post Session:   0/10    Medications Last Reviewed:  2020  Updated Objective Findings:  See Discharge Note from today   TREATMENT:   THERAPEUTIC EXERCISE: (53 minutes):  Exercises per grid below to improve mobility, strength, balance and coordination. Required moderate visual, verbal and manual cues to promote proper body alignment, promote proper body posture and promote proper body mechanics. Progressed resistance, range, repetitions and complexity of movement as indicated.      Date:  20 Date:  2020 Date:  2020   Activity/Exercise Parameters Parameters Parameters   Hamstring stretch Strap  4  x 30 seconds each Strap 4  x 30 seconds each  Strap 4  x 30 seconds each    Piriformis stretch 4 x 30 seconds each 4 x 30 seconds each  4  x 30 seconds each     SKTC 4 x 30 seconds each 4 x 30 seconds each 4  x 30 seconds each    DKTC 4x30 sec hold BLE's --- ---   I T band stretch  4x30 sec hold with strap ---  ---   Nu step  Level 5 x 10 minutes  Level 5 x 10 minutes  Level 5 x 10 minutes    Calf stretch Slant board, 4 x 30 seconds Slant board, 4 x 30 seconds Slant board, 4 x 30 seconds   Trunk rotation X 10 reps x 10 sec hold  10 x 10 seconds X 10   Pelvic tilts 20 reps 5 sec hold  --- ---   Standing hip flexion X 20 reps BLE's 2.5 lb wt.s  2 lbs, 2 x 10 ---   Standing hip abduction  2.5 lb wt.s 2 x 10 each LE  2 lbs, 2 x 10 ---   Standing hamstring curls X 20 reps 2.5 lb wt.s 2 lbs, 2 x 10 ---   Heel toe raises X 20 reps  2 lbs, 2 x 10 2 x 10   Hip adduction  Yellow ball x 10 reps x 10 sec hold  --- ---   Step ups 8 inch, L LE lead 2x10 reps  8 inch, 2 x 10 6 inch, 2 x 10 bilateral LE   Clamshells Red 2x10 reps Green, 2 x 10 each Red, 2 x 10 each   Side steps Yellow band up & down hallway twice Red,  2 x 10 each side Yellow, 2 x 10 steps each side   Sit to stand --- 2 x 10    Bridge with abduction --- Marjan Ashraf, 2 x 10      Time spent with patient reviewing proper muscle recruitment and technique with exercises. MANUAL THERAPY: (0 minutes): Joint mobilization and Soft tissue mobilization was utilized and necessary because of the patient's restricted joint motion, painful spasm, loss of articular motion and restricted motion of soft tissue   None today      MODALITIES: (0 minutes):  None today    HEP: As above; handouts given to patient for all exercises. Treatment/Session Summary:    · Response to Treatment: Patient with good tolerance for exercises. Level pelvis. Safe for discharge at this time.   · Communication/Consultation:  None today  · Equipment provided today:  Patient given red loop theraband for home  · Recommendations/Intent for next treatment session: Patient discharged to home program.    Total Treatment Billable Duration:  55 minutes  PT Patient Time In/Time Out  Time In: 1100  Time Out: Lisa 80, PT

## 2020-02-07 NOTE — THERAPY DISCHARGE
Olga Hsu  : 1950  [place]  Secondary: [address] [facility] at [address], [place], [address] [place] [address] Street  [phone]   [phone]       OUTPATIENT PHYSICAL THERAPY:Progress Report and Discharge 2020    ICD-10: Treatment Diagnosis: low back pain (M54.5)                Treatment Diagnosis 2: sciatica, right (M54.31)                Treatment Diagnosis 3: sciatica, left (M54.32)  Precautions: numbness and L LE weakness  Allergies: Latex; Ciprofloxacin; and Pcn [penicillins]   TREATMENT PLAN:  Effective Dates: 2019 TO 2/10/2020 (60 days). Frequency/Duration: 1-2 times a week for 60 Day(s) MEDICAL/REFERRING DIAGNOSIS:  Sciatica, right side [M54.31]  Sciatica, left side [M54.32]   DATE OF ONSET: chronic back pain which has been intermittent over quite a few years. L LE pain and worsened low back pain over the last year. REFERRING PHYSICIAN: Claire JewellrMD BELLA Orders: Evaluate and Treat   Return MD Appointment: after physical therapy     INITIAL ASSESSMENT:  Ms. Lee Wyman is a 78 y.o. female presenting to physical therapy with complaints of chronic low back pain which has worsened over the last year. She reports intermittent low back pain for years, but now having L LE pain and R thigh burning since hiking [place]. She was having pain into her L foot until about 2 weeks ago and now reports pain just to her knee. Patient rates her pain 7/10 currently, up to 10/10 at worst with increased activities, and 2/10 at best when not doing much or resting. When her pain is intense she is able to lay down on her back for about 30-40 minutes to get it to relieve some. Patient with difficulty with inclines and stairs. She is eager to return to her prior level of activities, including camping and hiking with her family.  Patient presents with increased pain, decreased strength, decreased ROM, decreased flexibility, impaired gait, impaired posture, impaired transfer ability, decreased activity tolerance, and overall impaired functional mobility. Patient is a good candidate for skilled physical therapy interventions to include manual therapy, therapeutic exercise, balance training, gait training, transfer training, postural re-education, body mechanics training, and pain modalities as needed. PROGRESS NOTE/ DISCHARGE 2/7/2020: Patient has been seen for 14 sessions of physical therapy from 12/12/19 to 2/7/20. She reports feeling much better since starting therapy with less pain overall, being able to walk her dog, improvements with stairs and housework. She still has some intermittent pain with stairs, but has returned to her prior level of activities. Patient met all of her goals and is independent with her home program. She is safe for discharge at this time. PROBLEM LIST (Impacting functional limitations):  1. Decreased Strength  2. Decreased ADL/Functional Activities  3. Decreased Transfer Abilities  4. Decreased Ambulation Ability/Technique  5. Decreased Balance  6. Increased Pain  7. Decreased Activity Tolerance  8. Decreased Pacing Skills  9. Decreased Work Simplification/Energy Conservation Techniques  10. Decreased Flexibility/Joint Mobility INTERVENTIONS PLANNED: (Treatment may consist of any combination of the following)  1. Balance Exercise  2. Bed Mobility  3. Cold  4. Electrical Stimulation  5. Family Education  6. Gait Training  7. Heat  8. Home Exercise Program (HEP)  9. Manual Therapy  10. Neuromuscular Re-education/Strengthening  11. Range of Motion (ROM)  12. Therapeutic Activites  13. Therapeutic Exercise/Strengthening  14. Transfer Training  15. Ultrasound (US)     GOALS: (Goals have been discussed and agreed upon with patient.)  Short-Term Functional Goals: Time Frame: 12/12/2019 to 1/11/19  11.  Patient demonstrates independence with home exercise program without verbal cueing provided by therapist. -GOAL MET  12. Patient will report no more than 4/10 low back pain at rest in order to demonstrate improved self pain control and tolerance. -GOAL MET  13. Patient will be educated in and demonstrate proper squat lift technique in order to decrease strain on her low back with lifting activities in her home. -GOAL MET  Discharge Goals: Time Frame: 12/12/2019 to 2/10/2020  16. Patient will report no more than 4/10 low back pain with ADLs and transfers in order to demonstrate improving activity tolerance and mobility. -GOAL MET  17. Patient will be able to sleep through the night without waking due to back pain in order to improve sleeping pattern for overall health and wellness. -GOAL MET  18. Patient will be able to walk her dog and go on light hikes with her family with minimal increased back pain in order to return to prior level of activities. -GOAL MET  19. Patient will be able to perform stairs with reciprocal pattern on the way up and down in order to demonstrate improved functional LE strength. -GOAL MET  20. Patient will improve Modified Oswestry Scale score to 13/50 from 18/50. -GOAL MET    Outcome Measure: Tool Used: Modified Oswestry Low Back Pain Questionnaire  Score:  Initial: 18/50  Most Recent: 11/50 (Date: 2/7/2020 )   Interpretation of Score: Each section is scored on a 0-5 scale, 5 representing the greatest disability. The scores of each section are added together for a total score of 50. UPDATED OBJECTIVE FINDINGS:  Palpation:          Lower L ASIS in supine. Minimal (from moderate) tightness and tenderness in L piriformis. ROM:          Lumbar ROM within functional limits  Moderate tightness bilateral hamstrings  Strength:     Motion Tested Left   (*/5) Right  (*/5)   Hip Flexion 5 (from 4) 5   Hip Abduction 4+ (from 4) 4+   Knee Extension 4+ (from 4) 5   Ankle Dorsiflexion 5 5   Gross core strength 3/5 as observed with transfers     Functional Mobility:         Patient with some discomfort with stairs leading with L LE, better with sleeping and house work. Able to walk her dog with minimal to no difficulties. Reason for Services/Other Comments:  · Patient discharged to [facility]. Rehabilitation Potential For Stated Goals: Good  Regarding Unknown Jen Porter's therapy, I certify that the treatment plan above will be carried out by a therapist or under their direction.   Thank you for this referral,  Paul Cleary, PT

## 2020-11-30 ENCOUNTER — TRANSCRIBE ORDER (OUTPATIENT)
Dept: SCHEDULING | Age: 70
End: 2020-11-30

## 2020-11-30 DIAGNOSIS — Z12.31 ENCOUNTER FOR SCREENING MAMMOGRAM FOR MALIGNANT NEOPLASM OF BREAST: Primary | ICD-10-CM

## 2020-12-14 ENCOUNTER — HOSPITAL ENCOUNTER (OUTPATIENT)
Dept: MAMMOGRAPHY | Age: 70
Discharge: HOME | End: 2020-12-14
Attending: FAMILY MEDICINE
Payer: MEDICARE

## 2020-12-14 DIAGNOSIS — N64.59 ABNORMAL BREAST FINDING: ICD-10-CM

## 2020-12-14 DIAGNOSIS — N63.10 BREAST MASS, RIGHT: ICD-10-CM

## 2020-12-14 PROCEDURE — 76642 ULTRASOUND BREAST LIMITED: CPT

## 2020-12-14 PROCEDURE — 77066 DX MAMMO INCL CAD BI: CPT

## 2021-06-09 ENCOUNTER — HOSPITAL ENCOUNTER (OUTPATIENT)
Dept: LAB | Age: 71
Discharge: HOME | End: 2021-06-09

## 2021-06-09 PROCEDURE — 88305 TISSUE EXAM BY PATHOLOGIST: CPT

## 2021-08-23 PROBLEM — M54.32 LEFT SIDED SCIATICA: Status: ACTIVE | Noted: 2021-08-23

## 2021-11-19 ENCOUNTER — TRANSCRIBE ORDER (OUTPATIENT)
Dept: SCHEDULING | Age: 71
End: 2021-11-19

## 2021-11-19 DIAGNOSIS — Z12.31 VISIT FOR SCREENING MAMMOGRAM: Primary | ICD-10-CM

## 2021-12-28 ENCOUNTER — HOSPITAL ENCOUNTER (OUTPATIENT)
Dept: MAMMOGRAPHY | Age: 71
Discharge: HOME | End: 2021-12-28
Attending: FAMILY MEDICINE
Payer: MEDICARE

## 2021-12-28 DIAGNOSIS — Z12.31 VISIT FOR SCREENING MAMMOGRAM: ICD-10-CM

## 2021-12-28 PROCEDURE — 77063 BREAST TOMOSYNTHESIS BI: CPT

## 2022-03-19 PROBLEM — M54.32 LEFT SIDED SCIATICA: Status: ACTIVE | Noted: 2021-08-23

## 2022-08-22 ENCOUNTER — OFFICE VISIT (OUTPATIENT)
Dept: FAMILY MEDICINE CLINIC | Facility: CLINIC | Age: 72
End: 2022-08-22
Payer: MEDICARE

## 2022-08-22 VITALS
OXYGEN SATURATION: 99 % | TEMPERATURE: 98.2 F | SYSTOLIC BLOOD PRESSURE: 110 MMHG | HEART RATE: 71 BPM | DIASTOLIC BLOOD PRESSURE: 70 MMHG | RESPIRATION RATE: 16 BRPM | WEIGHT: 120 LBS | BODY MASS INDEX: 22.08 KG/M2 | HEIGHT: 62 IN

## 2022-08-22 DIAGNOSIS — J01.00 ACUTE MAXILLARY SINUSITIS, RECURRENCE NOT SPECIFIED: Primary | ICD-10-CM

## 2022-08-22 PROCEDURE — 1123F ACP DISCUSS/DSCN MKR DOCD: CPT | Performed by: PHYSICIAN ASSISTANT

## 2022-08-22 PROCEDURE — G8399 PT W/DXA RESULTS DOCUMENT: HCPCS | Performed by: PHYSICIAN ASSISTANT

## 2022-08-22 PROCEDURE — 99213 OFFICE O/P EST LOW 20 MIN: CPT | Performed by: PHYSICIAN ASSISTANT

## 2022-08-22 PROCEDURE — G8420 CALC BMI NORM PARAMETERS: HCPCS | Performed by: PHYSICIAN ASSISTANT

## 2022-08-22 PROCEDURE — 3017F COLORECTAL CA SCREEN DOC REV: CPT | Performed by: PHYSICIAN ASSISTANT

## 2022-08-22 PROCEDURE — 1036F TOBACCO NON-USER: CPT | Performed by: PHYSICIAN ASSISTANT

## 2022-08-22 PROCEDURE — G8427 DOCREV CUR MEDS BY ELIG CLIN: HCPCS | Performed by: PHYSICIAN ASSISTANT

## 2022-08-22 PROCEDURE — 1090F PRES/ABSN URINE INCON ASSESS: CPT | Performed by: PHYSICIAN ASSISTANT

## 2022-08-22 RX ORDER — CLOBETASOL PROPIONATE 0.46 MG/ML
SOLUTION TOPICAL
COMMUNITY
Start: 2022-07-18

## 2022-08-22 RX ORDER — FLUCONAZOLE 150 MG/1
150 TABLET ORAL DAILY
Qty: 2 TABLET | Refills: 0 | Status: SHIPPED | OUTPATIENT
Start: 2022-08-22 | End: 2022-09-29 | Stop reason: SDUPTHER

## 2022-08-22 RX ORDER — IBUPROFEN 200 MG
CAPSULE ORAL
COMMUNITY

## 2022-08-22 RX ORDER — DOXYCYCLINE HYCLATE 100 MG
100 TABLET ORAL 2 TIMES DAILY
Qty: 20 TABLET | Refills: 0 | Status: SHIPPED | OUTPATIENT
Start: 2022-08-22 | End: 2022-09-01

## 2022-08-22 ASSESSMENT — ENCOUNTER SYMPTOMS
SINUS PRESSURE: 1
COUGH: 1
CHEST TIGHTNESS: 0
SINUS PAIN: 1
SHORTNESS OF BREATH: 0
EYE DISCHARGE: 0
SORE THROAT: 0
WHEEZING: 0
RHINORRHEA: 1

## 2022-08-22 ASSESSMENT — PATIENT HEALTH QUESTIONNAIRE - PHQ9
SUM OF ALL RESPONSES TO PHQ QUESTIONS 1-9: 0
1. LITTLE INTEREST OR PLEASURE IN DOING THINGS: 0
SUM OF ALL RESPONSES TO PHQ QUESTIONS 1-9: 0
2. FEELING DOWN, DEPRESSED OR HOPELESS: 0
SUM OF ALL RESPONSES TO PHQ QUESTIONS 1-9: 0
SUM OF ALL RESPONSES TO PHQ9 QUESTIONS 1 & 2: 0
SUM OF ALL RESPONSES TO PHQ QUESTIONS 1-9: 0

## 2022-08-22 NOTE — PROGRESS NOTES
[facility]  Phone [phone]      Patient: Jonelle Mejias  YOB: 1950  Age 67 y.o. Sex female  Medical Record:  [id number]  Visit Date: 08/22/22  Author:  Sienna Perez PA-C    Family [facility] Clinic Note    Chief Complaint   Patient presents with    Follow-up     Sinus infection , went to [facility] pt given a z david because she was allergic to penicillin        History of Present Illness  This is a 65-year-old female who presents today with complaints of persistent respiratory symptoms. She notes that she was seen at a local urgent care 7/21/2022 and treated for suspected sinusitis. The symptoms have been ongoing for 2 to 3 weeks prior to going to urgent care. She was given a Z-David and 5 days of prednisone. She notes that her symptoms did improve but did not fully resolve. In the last week the symptoms have worsened again. She has sinus congestion with pressure and pain. Notes fullness in her ears. Denies ear pain or discharge from the [address] No sore throat but has had postnasal drip. She feels that the postnasal drip is causing some cough as well. Denies chest congestion, shortness of breath, wheeze or hemoptysis. No fever or chills. Past History:    Past Medical history   Past Medical History:   Diagnosis Date    H/O seasonal allergies 12/19/2012    Herpes 12/19/2012    Insomnia 12/19/2012    Osteopenia 12/19/2012       Current Problem List:   Patient Active Problem List   Diagnosis    Herpes    H/O seasonal allergies    Osteopenia    Insomnia    Left sided sciatica       Current Medications: .   Current Outpatient Medications   Medication Sig Dispense Refill    Probiotic Product (PROBIOTIC-10 PO) Take by mouth      calcium carbonate (OYSTER SHELL CALCIUM 500 MG) 1250 (500 Ca) MG tablet Take by mouth      clobetasol (TEMOVATE) 0.05 % external solution APPLY TO SCALP WHEN ITCHING TWICE DAILY AS NEEDED      cetirizine Determinants of Health     Financial Resource Strain: Not on file   Food Insecurity: Not on file   Transportation Needs: Not on file   Physical Activity: Not on file   Stress: Not on file   Social Connections: Not on file   Intimate Partner Violence: Not on file   Housing Stability: Not on file         ROS  Review of Systems   Constitutional:  Negative for chills and fever. HENT:  Positive for congestion, postnasal drip, rhinorrhea, sinus pressure and sinus pain. Negative for ear discharge, ear pain, nosebleeds, sore throat and tinnitus. Eyes:  Negative for discharge. Respiratory:  Positive for cough. Negative for chest tightness, shortness of breath and wheezing. Cardiovascular:  Negative for chest pain, palpitations and leg swelling. Neurological:  Negative for dizziness and light-headedness. /70 (Site: Left Upper Arm, Position: Sitting, Cuff Size: Small Adult)   Pulse 71   Temp 98.2 °F (36.8 °C) (Temporal)   Resp 16   Ht 5' 2\" (1.575 m)   Wt 120 lb (54.4 kg)   SpO2 99%   BMI 21.95 kg/m²   Body mass index is 21.95 kg/m². Physical Exam    Physical Exam  Vitals and nursing note reviewed. Constitutional:       General: She is not in acute distress. Appearance: She is not toxic-appearing. HENT:      Head: Normocephalic. Right Ear: Tympanic membrane, ear canal and external ear normal.      Left Ear: Tympanic membrane, ear canal and external ear normal.      Nose: Congestion present. Right Sinus: Maxillary sinus tenderness present. No frontal sinus tenderness. Left Sinus: Maxillary sinus tenderness present. No frontal sinus tenderness. Mouth/Throat:      Pharynx: Oropharynx is clear. Eyes:      Conjunctiva/sclera: Conjunctivae normal.   Cardiovascular:      Rate and Rhythm: Normal rate and regular rhythm. Heart sounds: Normal heart sounds. Pulmonary:      Effort: Pulmonary effort is normal.      Breath sounds: Normal breath sounds.  No wheezing, rhonchi or rales. Musculoskeletal:      Cervical back: Neck supple. Lymphadenopathy:      Cervical: No cervical adenopathy. Neurological:      Mental Status: She is alert. Psychiatric:         Mood and Affect: Mood normal.         Behavior: Behavior normal.         Thought Content: Thought content normal.       ASSESSMENT & PLAN    ICD-10-CM    1. Acute maxillary sinusitis, recurrence not specified  J01.00 doxycycline hyclate (VIBRA-TABS) 100 MG tablet           1. Acute maxillary sinusitis, recurrence not specified  *Discussed that this is most likely a sinus infection. This can take a few weeks to fully resolve. Take the antibiotic (Doxycycline) as prescribed. I recommend Tylenol for fever or pain, Mucinex DM, over the counter, for cough and congestion. Consider use of over the counter Flonase - 2 sprays per nostril once a day. Make sure to wash hands frequently. Please return if symptoms don't improve or if worsening symptoms such as shortness of breath, fever > 102 or if feeling much worse.  - doxycycline hyclate (VIBRA-TABS) 100 MG tablet; Take 1 tablet by mouth 2 times daily for 10 days  Dispense: 20 tablet; Refill: 0      No orders of the defined types were placed in this encounter. I have reviewed the patient's past medical history, social history and family history and vitals. We have discussed treatment plan and follow up and given patient instructions. Patient's questions are answered and we will follow up as indicated. Dictated using voice recognition software. Proof read but unrecognized errors may exist.    Follow-up and Dispositions    Return if symptoms worsen or fail to improve.          Stephanie Zhu PA-C

## 2022-09-29 DIAGNOSIS — B37.31 VAGINAL CANDIDIASIS: Primary | ICD-10-CM

## 2022-09-29 RX ORDER — FLUCONAZOLE 150 MG/1
TABLET ORAL
Qty: 2 TABLET | Refills: 1 | Status: SHIPPED | OUTPATIENT
Start: 2022-09-29

## 2022-12-01 ENCOUNTER — TELEPHONE (OUTPATIENT)
Dept: FAMILY MEDICINE CLINIC | Facility: CLINIC | Age: 72
End: 2022-12-01

## 2022-12-01 DIAGNOSIS — Z13.1 ENCOUNTER FOR SCREENING FOR DIABETES MELLITUS: ICD-10-CM

## 2022-12-01 DIAGNOSIS — Z12.11 ENCOUNTER FOR SCREENING FOR MALIGNANT NEOPLASM OF COLON: Primary | ICD-10-CM

## 2022-12-01 DIAGNOSIS — Z13.0 SCREENING FOR BLOOD DISEASE: ICD-10-CM

## 2022-12-01 DIAGNOSIS — Z13.6 ENCOUNTER FOR SCREENING FOR CARDIOVASCULAR DISORDERS: ICD-10-CM

## 2022-12-06 ENCOUNTER — NURSE ONLY (OUTPATIENT)
Dept: FAMILY MEDICINE CLINIC | Facility: CLINIC | Age: 72
End: 2022-12-06

## 2022-12-06 DIAGNOSIS — Z13.0 SCREENING FOR BLOOD DISEASE: ICD-10-CM

## 2022-12-06 DIAGNOSIS — Z13.1 ENCOUNTER FOR SCREENING FOR DIABETES MELLITUS: ICD-10-CM

## 2022-12-06 LAB
ALBUMIN SERPL-MCNC: 3.6 G/DL (ref 3.2–4.6)
ALBUMIN/GLOB SERPL: 1.1 {RATIO} (ref 0.4–1.6)
ALP SERPL-CCNC: 50 U/L (ref 50–136)
ALT SERPL-CCNC: 30 U/L (ref 12–65)
ANION GAP SERPL CALC-SCNC: 4 MMOL/L (ref 2–11)
AST SERPL-CCNC: 23 U/L (ref 15–37)
BASOPHILS # BLD: 0 K/UL (ref 0–0.2)
BASOPHILS NFR BLD: 1 % (ref 0–2)
BILIRUB SERPL-MCNC: 0.2 MG/DL (ref 0.2–1.1)
BUN SERPL-MCNC: 20 MG/DL (ref 8–23)
CALCIUM SERPL-MCNC: 9.2 MG/DL (ref 8.3–10.4)
CHLORIDE SERPL-SCNC: 108 MMOL/L (ref 101–110)
CO2 SERPL-SCNC: 30 MMOL/L (ref 21–32)
CREAT SERPL-MCNC: 0.8 MG/DL (ref 0.6–1)
DIFFERENTIAL METHOD BLD: ABNORMAL
EOSINOPHIL # BLD: 0.3 K/UL (ref 0–0.8)
EOSINOPHIL NFR BLD: 5 % (ref 0.5–7.8)
ERYTHROCYTE [DISTWIDTH] IN BLOOD BY AUTOMATED COUNT: 13.4 % (ref 11.9–14.6)
GLOBULIN SER CALC-MCNC: 3.3 G/DL (ref 2.8–4.5)
GLUCOSE SERPL-MCNC: 82 MG/DL (ref 65–100)
HCT VFR BLD AUTO: 37.6 % (ref 35.8–46.3)
HGB BLD-MCNC: 12.2 G/DL (ref 11.7–15.4)
IMM GRANULOCYTES # BLD AUTO: 0 K/UL (ref 0–0.5)
IMM GRANULOCYTES NFR BLD AUTO: 0 % (ref 0–5)
LYMPHOCYTES # BLD: 1.9 K/UL (ref 0.5–4.6)
LYMPHOCYTES NFR BLD: 37 % (ref 13–44)
MCH RBC QN AUTO: 30.9 PG (ref 26.1–32.9)
MCHC RBC AUTO-ENTMCNC: 32.4 G/DL (ref 31.4–35)
MCV RBC AUTO: 95.2 FL (ref 82–102)
MONOCYTES # BLD: 0.4 K/UL (ref 0.1–1.3)
MONOCYTES NFR BLD: 8 % (ref 4–12)
NEUTS SEG # BLD: 2.5 K/UL (ref 1.7–8.2)
NEUTS SEG NFR BLD: 49 % (ref 43–78)
NRBC # BLD: 0 K/UL (ref 0–0.2)
PLATELET # BLD AUTO: 286 K/UL (ref 150–450)
PMV BLD AUTO: 9.1 FL (ref 9.4–12.3)
POTASSIUM SERPL-SCNC: 4 MMOL/L (ref 3.5–5.1)
PROT SERPL-MCNC: 6.9 G/DL (ref 6.3–8.2)
RBC # BLD AUTO: 3.95 M/UL (ref 4.05–5.2)
SODIUM SERPL-SCNC: 142 MMOL/L (ref 133–143)
WBC # BLD AUTO: 5.1 K/UL (ref 4.3–11.1)

## 2022-12-12 ENCOUNTER — OFFICE VISIT (OUTPATIENT)
Dept: FAMILY MEDICINE CLINIC | Facility: CLINIC | Age: 72
End: 2022-12-12
Payer: MEDICARE

## 2022-12-12 ENCOUNTER — NURSE ONLY (OUTPATIENT)
Dept: FAMILY MEDICINE CLINIC | Facility: CLINIC | Age: 72
End: 2022-12-12

## 2022-12-12 VITALS
BODY MASS INDEX: 22.26 KG/M2 | SYSTOLIC BLOOD PRESSURE: 135 MMHG | HEART RATE: 74 BPM | HEIGHT: 62 IN | DIASTOLIC BLOOD PRESSURE: 54 MMHG | OXYGEN SATURATION: 98 % | RESPIRATION RATE: 16 BRPM | WEIGHT: 121 LBS | TEMPERATURE: 97.2 F

## 2022-12-12 DIAGNOSIS — Z00.00 MEDICARE ANNUAL WELLNESS VISIT, SUBSEQUENT: Primary | ICD-10-CM

## 2022-12-12 DIAGNOSIS — R53.82 CHRONIC FATIGUE: ICD-10-CM

## 2022-12-12 DIAGNOSIS — E53.8 LOW SERUM VITAMIN B12: ICD-10-CM

## 2022-12-12 DIAGNOSIS — B00.9 HERPES: ICD-10-CM

## 2022-12-12 DIAGNOSIS — E53.8 LOW FOLATE: ICD-10-CM

## 2022-12-12 DIAGNOSIS — M54.32 LEFT SIDED SCIATICA: ICD-10-CM

## 2022-12-12 DIAGNOSIS — F41.9 ANXIETY: ICD-10-CM

## 2022-12-12 DIAGNOSIS — Z78.0 POST-MENOPAUSAL: ICD-10-CM

## 2022-12-12 PROCEDURE — G0439 PPPS, SUBSEQ VISIT: HCPCS | Performed by: FAMILY MEDICINE

## 2022-12-12 PROCEDURE — 1090F PRES/ABSN URINE INCON ASSESS: CPT | Performed by: FAMILY MEDICINE

## 2022-12-12 PROCEDURE — 3017F COLORECTAL CA SCREEN DOC REV: CPT | Performed by: FAMILY MEDICINE

## 2022-12-12 PROCEDURE — 90677 PCV20 VACCINE IM: CPT | Performed by: FAMILY MEDICINE

## 2022-12-12 PROCEDURE — G8399 PT W/DXA RESULTS DOCUMENT: HCPCS | Performed by: FAMILY MEDICINE

## 2022-12-12 PROCEDURE — G8484 FLU IMMUNIZE NO ADMIN: HCPCS | Performed by: FAMILY MEDICINE

## 2022-12-12 PROCEDURE — 99214 OFFICE O/P EST MOD 30 MIN: CPT | Performed by: FAMILY MEDICINE

## 2022-12-12 PROCEDURE — 1123F ACP DISCUSS/DSCN MKR DOCD: CPT | Performed by: FAMILY MEDICINE

## 2022-12-12 PROCEDURE — G8427 DOCREV CUR MEDS BY ELIG CLIN: HCPCS | Performed by: FAMILY MEDICINE

## 2022-12-12 PROCEDURE — G8420 CALC BMI NORM PARAMETERS: HCPCS | Performed by: FAMILY MEDICINE

## 2022-12-12 PROCEDURE — 1036F TOBACCO NON-USER: CPT | Performed by: FAMILY MEDICINE

## 2022-12-12 PROCEDURE — G0009 ADMIN PNEUMOCOCCAL VACCINE: HCPCS | Performed by: FAMILY MEDICINE

## 2022-12-12 RX ORDER — ESTRADIOL 0.1 MG/G
CREAM VAGINAL
Qty: 42.5 G | Refills: 5 | Status: SHIPPED | OUTPATIENT
Start: 2022-12-12

## 2022-12-12 RX ORDER — CYCLOBENZAPRINE HCL 10 MG
TABLET ORAL
Qty: 30 TABLET | Refills: 1 | Status: SHIPPED | OUTPATIENT
Start: 2022-12-12

## 2022-12-12 RX ORDER — ESCITALOPRAM OXALATE 10 MG/1
10 TABLET ORAL DAILY
Qty: 90 TABLET | Refills: 3 | Status: SHIPPED | OUTPATIENT
Start: 2022-12-12

## 2022-12-12 RX ORDER — VALACYCLOVIR HYDROCHLORIDE 500 MG/1
500 TABLET, FILM COATED ORAL DAILY
Qty: 90 TABLET | Refills: 3 | Status: SHIPPED | OUTPATIENT
Start: 2022-12-12

## 2022-12-12 ASSESSMENT — PATIENT HEALTH QUESTIONNAIRE - PHQ9
SUM OF ALL RESPONSES TO PHQ QUESTIONS 1-9: 2
2. FEELING DOWN, DEPRESSED OR HOPELESS: 1
SUM OF ALL RESPONSES TO PHQ QUESTIONS 1-9: 2
SUM OF ALL RESPONSES TO PHQ QUESTIONS 1-9: 2
SUM OF ALL RESPONSES TO PHQ9 QUESTIONS 1 & 2: 2
1. LITTLE INTEREST OR PLEASURE IN DOING THINGS: 1
SUM OF ALL RESPONSES TO PHQ QUESTIONS 1-9: 2

## 2022-12-12 ASSESSMENT — LIFESTYLE VARIABLES
HOW MANY STANDARD DRINKS CONTAINING ALCOHOL DO YOU HAVE ON A TYPICAL DAY: PATIENT DOES NOT DRINK
HOW OFTEN DO YOU HAVE A DRINK CONTAINING ALCOHOL: NEVER

## 2022-12-12 NOTE — PROGRESS NOTES
Medicare Annual Wellness Visit    Julia Hernandez is here for Medicare AWV    Assessment & Plan   Medicare annual wellness visit, subsequent  Left sided sciatica  -     cyclobenzaprine (FLEXERIL) 10 MG tablet; Qhs prn, Disp-30 tablet, R-1Normal  Anxiety  -     escitalopram (LEXAPRO) 10 MG tablet; Take 1 tablet by mouth daily, Disp-90 tablet, R-3Normal  Post-menopausal  -     estradiol (ESTRACE) 0.1 MG/GM vaginal cream; 2 g daily for 7 days then twice a week, Disp-42.5 g, R-5Normal  Herpes  -     valACYclovir (VALTREX) 500 MG tablet; Take 1 tablet by mouth daily, Disp-90 tablet, R-3Normal  Chronic fatigue  -     TSH; Future  Low serum vitamin B12  -     Vitamin B12; Future  Low folate  -     Folate; Future      Recommendations for Preventive Services Due: see orders and patient instructions/AVS.  Recommended screening schedule for the next 5-10 years is provided to the patient in written form: see Patient Instructions/AVS.     Return in 1 year (on 12/12/2023) for Medicare Annual Wellness Visit in 1 year. Subjective   The following acute and/or chronic problems were also addressed today:  More stressed than she has been and wonders if she could increase the Lexapro. She has taken it for quite some time and has done well for her but she is only on 5 mg. She does not have any side effects from it. She is not depressed. No thoughts of suicide. She does admit that she has been tired a lot. She is wondering if her thyroid or B12 or something could be out of line. Blood work was gone over with the patient that she had done and all was normal.    Increase Lexapro to 10 mg. Check labs today. Patient's complete Health Risk Assessment and screening values have been reviewed and are found in Flowsheets. The following problems were reviewed today and where indicated follow up appointments were made and/or referrals ordered.     Positive Risk Factor Screenings with Interventions:               General HRA Questions:  Select all that apply: (!) New or Increased Fatigue, Stress    Fatigue Interventions:    See AVS for additional education material  See A/P for plan and any pertinent orders  See above discussion in HPI. Stress Interventions:    See AVS for additional education material  See A/P for plan and any pertinent orders  See above discussion in HPI. Vision Screen:  Do you have difficulty driving, watching TV, or doing any of your daily activities because of your eyesight?: No  Have you had an eye exam within the past year?: (!) No  No results found. Interventions:   Patient encouraged to make appointment with their eye specialist  See AVS for additional education material  See A/P for any pertinent orders    Safety:  Do you have either shower bars, grab bars, non-slip mats or non-slip surfaces in your shower or bathtub?: (!) No  Interventions:    See AVS for additional education material  See A/P for plan and any pertinent orders     Safety issues gone over with patient. Advanced Directives:  Do you have a Living Will?: (!) No    Intervention:  Living will info given          Objective   Vitals:    12/12/22 0925   BP: (!) 135/54   Pulse: 74   Resp: 16   Temp: 97.2 °F (36.2 °C)   SpO2: 98%   Weight: 121 lb (54.9 kg)   Height: 5' 2\" (1.575 m)      Body mass index is 22.13 kg/m². Physical Exam  Neck: neck supple and non tender without mass, no thyromegaly or thyroid nodules, no cervical lymphadenopathy   Pulmonary/Chest: clear to auscultation bilaterally- no wheezes, rales or rhonchi, normal air movement, no respiratory distress  Cardiovascular: normal rate, normal S1 and S2, and no gallops       Allergies   Allergen Reactions    Latex Rash    Ciprofloxacin Other (See Comments)     Jittery    Clarithromycin Nausea Only    Penicillins Rash     Prior to Visit Medications    Medication Sig Taking?  Authorizing Provider   escitalopram (LEXAPRO) 10 MG tablet Take 1 tablet by mouth daily Yes Luis Chapman Eugene Quezada MD   cyclobenzaprine (FLEXERIL) 10 MG tablet Qhs prn Yes Keli Kelley MD   estradiol (ESTRACE) 0.1 MG/GM vaginal cream 2 g daily for 7 days then twice a week Yes Keli Kelley MD   valACYclovir (VALTREX) 500 MG tablet Take 1 tablet by mouth daily Yes Keli Kelley MD   fluconazole (DIFLUCAN) 150 MG tablet 1 tab orally now; repeat dose in 3 days if symptoms not resolved.  Yes Casandra Barrow MD   Probiotic Product (PROBIOTIC-10 PO) Take by mouth Yes Historical Provider, MD   calcium carbonate (OYSTER SHELL CALCIUM 500 MG) 1250 (500 Ca) MG tablet Take by mouth Yes Historical Provider, MD   clobetasol (TEMOVATE) 0.05 % external solution APPLY TO SCALP WHEN ITCHING TWICE DAILY AS NEEDED Yes Historical Provider, MD   cetirizine (ZYRTEC) 10 MG tablet Take by mouth Yes [facility] [employer]       CareTeam (Including outside providers/suppliers regularly involved in providing care):   Patient Care Team:  Keli Kelley MD as PCP - Nataly Hood MD as PCP - [facility] Empaneled Provider     Reviewed and updated this visit:  Tobacco  Allergies  Meds  Problems  Med Hx  Surg Hx  Soc Hx  Fam Hx

## 2022-12-12 NOTE — PATIENT INSTRUCTIONS
Fatigue: Care Instructions  Your Care Instructions     Fatigue is a feeling of tiredness, exhaustion, or lack of energy. You may feel fatigue because of too much or not enough activity. It can also come from stress, lack of sleep, boredom, and poor diet. Many medical problems, such as viral infections, can cause fatigue. Emotional problems, especially depression, are often the cause of fatigue. Fatigue is most often a symptom of another problem. Treatment for fatigue depends on the cause. For example, if you have fatigue because you have a certain health problem, treating this problem also treats your fatigue. If depression or anxiety is the cause, treatment may help. Follow-up care is a key part of your treatment and safety. Be sure to make and go to all appointments, and call your doctor if you are having problems. It's also a good idea to know your test results and keep a list of the medicines you take. How can you care for yourself at home? Get regular exercise. But don't overdo it. Go back and forth between rest and exercise. Get plenty of rest.  Eat a healthy diet. Do not skip meals, especially breakfast.  Reduce your use of caffeine, tobacco, and alcohol. Caffeine is most often found in coffee, tea, cola drinks, and chocolate. Limit medicines that can cause fatigue. This includes tranquilizers and cold and allergy medicines. When should you call for help? Watch closely for changes in your health, and be sure to contact your doctor if:    You have new symptoms such as fever or a rash.     Your fatigue gets worse.     You have been feeling down, depressed, or hopeless. Or you may have lost interest in things that you usually enjoy.     You are not getting better as expected. Where can you learn more? Go to http://www.woods.com/ and enter Z590 to learn more about \"Fatigue: Care Instructions. \"  Current as of: February 9, 2022               Content Version: 13.5  © 9238-7698 Healthwise, Inc[employer]. Care instructions adapted under license by [facility] ([facility]). If you have questions about a medical condition or this instruction, always ask your healthcare professional. [address] any warranty or liability for your use of this information. Learning About Stress  What is stress? Stress is what you feel when you have to handle more than you are used to. Stress is a fact of life for most people, and it affects everyone differently. What causes stress for you may not be stressful for someone else. A lot of things can cause stress. You may feel stress when you go on a job interview, take a test, or run a race. This kind of short-term stress is normal and even useful. It can help you if you need to work hard or react quickly. For example, stress can help you finish an important job on time. Stress also can last a long time. Long-term stress is caused by stressful situations or events. Examples of long-term stress include long-term health problems, ongoing problems at work, or conflicts in your family. Long-term stress can harm your health. How does stress affect your health? When you are stressed, your body responds as though you are in danger. It makes hormones that speed up your heart, make you breathe faster, and give you a burst of energy. This is called the fight-or-flight stress response. If the stress is over quickly, your body goes back to normal and no harm is done. But if stress happens too often or lasts too long, it can have bad effects. Long-term stress can make you more likely to get sick, and it can make symptoms of some diseases worse. If you tense up when you are stressed, you may develop neck, shoulder, or low back pain. Stress is linked to high blood pressure and heart disease. Stress also harms your emotional health. It can make you cruz, tense, or depressed.  Your relationships may suffer, and you may not do well at work or school. What can you do to manage stress? How to relax your mind   Write. It may help to write about things that are bothering you. This helps you find out how much stress you feel and what is causing it. When you know this, you can find better ways to cope. Let your feelings out. Talk, laugh, cry, and express anger when you need to. Talking with friends, family, a counselor, or a member of the clergy about your feelings is a healthy way to relieve stress. Do something you enjoy. For example, listen to music or go to a movie. Practice your hobby or do volunteer work. Meditate. This can help you relax, because you are not worrying about what happened before or what may happen in the future. Do guided imagery. Imagine yourself in any setting that helps you feel calm. You can use audiotapes, books, or a teacher to guide you. How to relax your body   Do something active. Exercise or activity can help reduce stress. Walking is a great way to get started. Even everyday activities such as housecleaning or yard work can help. Do breathing exercises. For example:  From a standing position, bend forward from the waist with your knees slightly bent. Let your arms dangle close to the floor. Breathe in slowly and deeply as you return to a standing position. Roll up slowly and lift your head last.  Hold your breath for just a few seconds in the standing position. Breathe out slowly and bend forward from the waist.  Try yoga or freddy chi. These techniques combine exercise and meditation. You may need some training at first to learn them. What can you do to prevent stress? Manage your time. This helps you find time to do the things you want and need to do. Get enough sleep. Your body recovers from the stresses of the day while you are sleeping. Get support. Your family, friends, and community can make a difference in how you experience stress. Where can you learn more?   Go to http://www.woods.com/ and enter D091 to learn more about \"Learning About Stress. \"  Current as of: October 6, 2021               Content Version: 13.5  © 2006-2022 Healthwise, [employer]. Care instructions adapted under license by [facility] ([facility]). If you have questions about a medical condition or this instruction, always ask your healthcare professional. [address] [facility] [address] any warranty or liability for your use of this information. Learning About Vision Tests  What are vision tests? The four most common vision tests are visual acuity tests, refraction, visual field tests, and color vision tests. Visual acuity (sharpness) tests  These tests are used: To see if you need glasses or contact lenses. To monitor an eye problem. To check an eye injury. Visual acuity tests are done as part of routine exams. You may also have this test when you get your 's license or apply for some types of jobs. Visual field tests  These tests are used: To check for vision loss in any area of your range of vision. To screen for certain eye diseases. To look for nerve damage after a stroke, head injury, or other problem that could reduce blood flow to the brain. Refraction and color tests  A refraction test is done to find the right prescription for glasses and contact lenses. A color vision test is done to check for color blindness. Color vision is often tested as part of a routine exam. You may also have this test when you apply for a job where recognizing different colors is important, such as , electronics, or the [employer]. How are vision tests done? Visual acuity test   You cover one eye at a time. You read aloud from a wall chart across the room. You read aloud from a small card that you hold in your hand. Refraction   You look into a special device. The device puts lenses of different strengths in front of each eye to see how strong your glasses or contact lenses need to be.   Visual field tests Your doctor may have you look through special machines. Or your doctor may simply have you stare straight ahead while they move a finger into and out of your field of vision. Color vision test   You look at pieces of printed test patterns in various colors. You say what number or symbol you see. Your doctor may have you trace the number or symbol using a pointer. How do these tests feel? There is very little chance of having a problem from this test. If dilating drops are used for a vision test, they may make the eyes sting and cause a medicine taste in the mouth. Follow-up care is a key part of your treatment and safety. Be sure to make and go to all appointments, and call your doctor if you are having problems. It's also a good idea to know your test results and keep a list of the medicines you take. Where can you learn more? Go to http://SeeFuture.azar.com/ and enter G551 to learn more about \"Learning About Vision Tests. \"  Current as of: October 12, 2022               Content Version: 13.5  © 2006-2022 [employer]. Care instructions adapted under license by [facility] ([facility]). If you have questions about a medical condition or this instruction, always ask your healthcare professional. [place] [address] any warranty or liability for your use of this information. Advance Directives: Care Instructions  Overview  An advance directive is a legal way to state your wishes at the end of your life. It tells your family and your doctor what to do if you can't say what you want. There are two main types of advance directives. You can change them any time your wishes change. Living will. This form tells your family and your doctor your wishes about life support and other treatment. The form is also called a declaration. Medical power of . This form lets you name a person to make treatment decisions for you when you can't speak for yourself.  This person is called a health care agent (health care proxy, health care surrogate). The form is also called a durable power of  for health care. If you do not have an advance directive, decisions about your medical care may be made by a family member, or by a doctor or a  who doesn't know you. It may help to think of an advance directive as a gift to the people who care for you. If you have one, they won't have to make tough decisions by themselves. For more information, including forms for your state, see the [address] website (www.caringinfo.org/planning/advance-directives/). Follow-up care is a key part of your treatment and safety. Be sure to make and go to all appointments, and call your doctor if you are having problems. It's also a good idea to know your test results and keep a list of the medicines you take. What should you include in an advance directive? Many states have a unique advance directive form. (It may ask you to address specific issues.) Or you might use a universal form that's approved by many states. If your form doesn't tell you what to address, it may be hard to know what to include in your advance directive. Use the questions below to help you get started. Who do you want to make decisions about your medical care if you are not able to? What life-support measures do you want if you have a serious illness that gets worse over time or can't be cured? What are you most afraid of that might happen? (Maybe you're afraid of having pain, losing your independence, or being kept alive by machines.)  Where would you prefer to die? (Your home? A hospital? A nursing home?)  Do you want to donate your organs when you die? Do you want certain Congregational practices performed before you die? When should you call for help? Be sure to contact your doctor if you have any questions. Where can you learn more?   Go to http://www.azar.com/ and enter R264 to learn more about \"Advance Directives: Care Instructions. \"  Current as of: June 16, 2022               Content Version: 13.5  © 5108-2228 Healthwise, [employer]. Care instructions adapted under license by [facility] ([facility]). If you have questions about a medical condition or this instruction, always ask your healthcare professional. [address] any warranty or liability for your use of this information. Personalized Preventive Plan for Diallo Ly - 12/12/2022  Medicare offers a range of preventive health benefits. Some of the tests and screenings are paid in full while other may be subject to a deductible, co-insurance, and/or copay. Some of these benefits include a comprehensive review of your medical history including lifestyle, illnesses that may run in your family, and various assessments and screenings as appropriate. After reviewing your medical record and screening and assessments performed today your provider may have ordered immunizations, labs, imaging, and/or referrals for you. A list of these orders (if applicable) as well as your Preventive Care list are included within your After Visit Summary for your review. Other Preventive Recommendations:    A preventive eye exam performed by an eye specialist is recommended every 1-2 years to screen for glaucoma; cataracts, macular degeneration, and other eye disorders. A preventive dental visit is recommended every 6 months. Try to get at least 150 minutes of exercise per week or 10,000 steps per day on a pedometer . Order or download the FREE \"Exercise & Physical Activity: Your Everyday Guide\" from The [employer]. Call 4-169.488.7172 or search The [employer] online. You need 1754-0126 mg of calcium and 7042-6723 IU of vitamin D per day.  It is possible to meet your calcium requirement with diet alone, but a vitamin D supplement is usually necessary to meet this goal.  When exposed to the sun, use a sunscreen that protects against both UVA and UVB radiation with an SPF of 30 or greater. Reapply every 2 to 3 hours or after sweating, drying off with a towel, or swimming. Always wear a seat belt when traveling in a car. Always wear a helmet when riding a bicycle or motorcycle.

## 2022-12-14 LAB
FOLATE SERPL-MCNC: 18.3 NG/ML (ref 3.1–17.5)
TSH, 3RD GENERATION: 1.99 UIU/ML (ref 0.36–3.74)
VIT B12 SERPL-MCNC: 199 PG/ML (ref 193–986)

## 2022-12-29 ENCOUNTER — HOSPITAL ENCOUNTER (OUTPATIENT)
Dept: MAMMOGRAPHY | Age: 72
Discharge: HOME | End: 2022-12-29
Payer: MEDICARE

## 2022-12-29 DIAGNOSIS — Z12.31 VISIT FOR SCREENING MAMMOGRAM: ICD-10-CM

## 2022-12-29 PROCEDURE — 77067 SCR MAMMO BI INCL CAD: CPT

## 2023-03-10 SDOH — ECONOMIC STABILITY: FOOD INSECURITY: WITHIN THE PAST 12 MONTHS, YOU WORRIED THAT YOUR FOOD WOULD RUN OUT BEFORE YOU GOT MONEY TO BUY MORE.: PATIENT DECLINED

## 2023-03-10 SDOH — ECONOMIC STABILITY: FOOD INSECURITY: WITHIN THE PAST 12 MONTHS, THE FOOD YOU BOUGHT JUST DIDN'T LAST AND YOU DIDN'T HAVE MONEY TO GET MORE.: PATIENT DECLINED

## 2023-03-10 SDOH — ECONOMIC STABILITY: HOUSING INSECURITY
IN THE LAST 12 MONTHS, WAS THERE A TIME WHEN YOU DID NOT HAVE A STEADY PLACE TO SLEEP OR SLEPT IN A SHELTER (INCLUDING NOW)?: NO

## 2023-03-10 SDOH — ECONOMIC STABILITY: TRANSPORTATION INSECURITY
IN THE PAST 12 MONTHS, HAS LACK OF TRANSPORTATION KEPT YOU FROM MEETINGS, WORK, OR FROM GETTING THINGS NEEDED FOR DAILY LIVING?: NO

## 2023-03-10 SDOH — ECONOMIC STABILITY: INCOME INSECURITY: HOW HARD IS IT FOR YOU TO PAY FOR THE VERY BASICS LIKE FOOD, HOUSING, MEDICAL CARE, AND HEATING?: PATIENT DECLINED

## 2023-03-13 ENCOUNTER — OFFICE VISIT (OUTPATIENT)
Dept: FAMILY MEDICINE CLINIC | Facility: CLINIC | Age: 73
End: 2023-03-13
Payer: MEDICARE

## 2023-03-13 VITALS
HEART RATE: 64 BPM | WEIGHT: 121 LBS | DIASTOLIC BLOOD PRESSURE: 53 MMHG | BODY MASS INDEX: 22.26 KG/M2 | HEIGHT: 62 IN | SYSTOLIC BLOOD PRESSURE: 118 MMHG | RESPIRATION RATE: 16 BRPM | OXYGEN SATURATION: 99 % | TEMPERATURE: 96.3 F

## 2023-03-13 DIAGNOSIS — F51.01 PRIMARY INSOMNIA: ICD-10-CM

## 2023-03-13 DIAGNOSIS — F41.9 ANXIETY: Primary | ICD-10-CM

## 2023-03-13 PROCEDURE — 1036F TOBACCO NON-USER: CPT | Performed by: FAMILY MEDICINE

## 2023-03-13 PROCEDURE — G8420 CALC BMI NORM PARAMETERS: HCPCS | Performed by: FAMILY MEDICINE

## 2023-03-13 PROCEDURE — G8399 PT W/DXA RESULTS DOCUMENT: HCPCS | Performed by: FAMILY MEDICINE

## 2023-03-13 PROCEDURE — 1090F PRES/ABSN URINE INCON ASSESS: CPT | Performed by: FAMILY MEDICINE

## 2023-03-13 PROCEDURE — 1123F ACP DISCUSS/DSCN MKR DOCD: CPT | Performed by: FAMILY MEDICINE

## 2023-03-13 PROCEDURE — 99214 OFFICE O/P EST MOD 30 MIN: CPT | Performed by: FAMILY MEDICINE

## 2023-03-13 PROCEDURE — G8427 DOCREV CUR MEDS BY ELIG CLIN: HCPCS | Performed by: FAMILY MEDICINE

## 2023-03-13 PROCEDURE — 3017F COLORECTAL CA SCREEN DOC REV: CPT | Performed by: FAMILY MEDICINE

## 2023-03-13 PROCEDURE — G8484 FLU IMMUNIZE NO ADMIN: HCPCS | Performed by: FAMILY MEDICINE

## 2023-03-13 RX ORDER — TRAZODONE HYDROCHLORIDE 50 MG/1
50 TABLET ORAL NIGHTLY PRN
Qty: 90 TABLET | Refills: 3 | Status: SHIPPED | OUTPATIENT
Start: 2023-03-13

## 2023-03-13 RX ORDER — ESCITALOPRAM OXALATE 10 MG/1
10 TABLET ORAL DAILY
Qty: 90 TABLET | Refills: 3 | Status: SHIPPED | OUTPATIENT
Start: 2023-03-13

## 2023-03-13 ASSESSMENT — ANXIETY QUESTIONNAIRES
GAD7 TOTAL SCORE: 4
4. TROUBLE RELAXING: 0
1. FEELING NERVOUS, ANXIOUS, OR ON EDGE: 1
7. FEELING AFRAID AS IF SOMETHING AWFUL MIGHT HAPPEN: 0
IF YOU CHECKED OFF ANY PROBLEMS ON THIS QUESTIONNAIRE, HOW DIFFICULT HAVE THESE PROBLEMS MADE IT FOR YOU TO DO YOUR WORK, TAKE CARE OF THINGS AT HOME, OR GET ALONG WITH OTHER PEOPLE: NOT DIFFICULT AT ALL
6. BECOMING EASILY ANNOYED OR IRRITABLE: 0
3. WORRYING TOO MUCH ABOUT DIFFERENT THINGS: 1
5. BEING SO RESTLESS THAT IT IS HARD TO SIT STILL: 1
2. NOT BEING ABLE TO STOP OR CONTROL WORRYING: 1

## 2023-03-13 ASSESSMENT — PATIENT HEALTH QUESTIONNAIRE - PHQ9
1. LITTLE INTEREST OR PLEASURE IN DOING THINGS: 0
SUM OF ALL RESPONSES TO PHQ QUESTIONS 1-9: 0
2. FEELING DOWN, DEPRESSED OR HOPELESS: 0
SUM OF ALL RESPONSES TO PHQ QUESTIONS 1-9: 0
SUM OF ALL RESPONSES TO PHQ9 QUESTIONS 1 & 2: 0
SUM OF ALL RESPONSES TO PHQ QUESTIONS 1-9: 0
SUM OF ALL RESPONSES TO PHQ QUESTIONS 1-9: 0

## 2023-03-13 NOTE — PROGRESS NOTES
[address] [facility] [address]  Phone: [phone] Fax [phone]  Lex Bill MD  3/13/2023           Ms. Jose Howard  is a 75y.o.  year old  female patient who comes in to follow-up on issues related to anxiety. She is doing much better on the higher dose of Lexapro. She feels calmer and is able to handle stress better. She is not depressed. No side effects. No thoughts of suicide. She still does not sleep very well though she does sleep better. BAKARI-7 SCREENING 3/13/2023   Feeling nervous, anxious, or on edge Several days   Not being able to stop or control worrying Several days   Worrying too much about different things Several days   Trouble relaxing Not at all   Being so restless that it is hard to sit still Several days   Becoming easily annoyed or irritable Not at all   Feeling afraid as if something awful might happen Not at all   BAKARI-7 Total Score 4   How difficult have these problems made it for you to do your work, take care of things at home, or get along with other people? Not difficult at all          Ms. Jose Howard  has  has a past medical history of H/O seasonal allergies, Herpes, Insomnia, and Osteopenia. Ms. Jose Howard  has  has a past surgical history that includes heent; other surgical history; cyst removal; Breast biopsy (Right, 2016); and Hysterectomy (age 31).     Ms. Jose Howard   Current Outpatient Medications   Medication Sig Dispense Refill    escitalopram (LEXAPRO) 10 MG tablet Take 1 tablet by mouth daily 90 tablet 3    traZODone (DESYREL) 50 MG tablet Take 1 tablet by mouth nightly as needed for Sleep 90 tablet 3    cyclobenzaprine (FLEXERIL) 10 MG tablet Qhs prn 30 tablet 1    estradiol (ESTRACE) 0.1 MG/GM vaginal cream 2 g daily for 7 days then twice a week 42.5 g 5    valACYclovir (VALTREX) 500 MG tablet Take 1 tablet by mouth daily 90 tablet 3    fluconazole (DIFLUCAN) 150 MG tablet 1 tab orally now; repeat dose in 3 days if symptoms not resolved. 2 tablet 1    calcium carbonate (OYSTER SHELL CALCIUM 500 MG) 1250 (500 Ca) MG tablet Take by mouth      clobetasol (TEMOVATE) 0.05 % external solution APPLY TO SCALP WHEN ITCHING TWICE DAILY AS NEEDED      cetirizine (ZYRTEC) 10 MG tablet Take by mouth       No current facility-administered medications for this visit. Ms. Harrell Session History    Marital status:      Spouse name: None    Number of children: None    Years of education: None    Highest education level: None   Tobacco Use    Smoking status: Former     Packs/day: 0.00     Types: Cigarettes     Quit date: 2004     Years since quittin.2    Smokeless tobacco: Never   Substance and Sexual Activity    Alcohol use: No    Drug use: No     Social Determinants of Health     Financial Resource Strain: Unknown    Difficulty of Paying Living Expenses: Patient refused   Food Insecurity: Unknown    Worried About Running Out of Food in the Last Year: Patient refused    [address] in the Last Year: Patient refused   Transportation Needs: Unknown    Lack of Transportation (Non-Medical): No   Physical Activity: Sufficiently Active    Days of Exercise per Week: 7 days    Minutes of Exercise per Session: 60 min   Housing Stability: Unknown    Unstable Housing in the Last Year: No       Ms. Taniya Mclean   Family History   Problem Relation Age of Onset    Cancer Other         Aunt-Melanoma    Breast Cancer Neg Hx     Cancer Maternal Uncle         prostate    Alzheimer's Disease Maternal Aunt     Asthma Maternal Grandfather     Diabetes Maternal Grandfather     Cancer Brother         Prostate    Cancer Father         Lung & \"everywhere\"    Lung Disease Father         ? asbesiosis    Emphysema Mother     Osteoporosis Mother             Ms. Taniya Mclean  has the following allergies:    Allergies   Allergen Reactions    Latex Rash    Ciprofloxacin Other (See Comments)     Jittery    Clarithromycin Nausea Only    Penicillins Rash       BP (!) 118/53   Pulse 64   Temp (!) 96.3 °F (35.7 °C)   Resp 16   Ht 5' 2\" (1.575 m)   Wt 121 lb (54.9 kg)   SpO2 99%   BMI 22.13 kg/m²     HEENT: Normocephalic, atraumatic, pupils equal and reactive to light.   Neck: Supple, no masses or thyromegaly.  Lungs: clear to auscultation bilaterally.  CV: regular rate and rhythm, without murmurs, rubs, or gallops  Ext: No lower extremity edema.    Khadijah was seen today for anxiety.    Diagnoses and all orders for this visit:    Anxiety  -     escitalopram (LEXAPRO) 10 MG tablet; Take 1 tablet by mouth daily    Primary insomnia  -     traZODone (DESYREL) 50 MG tablet; Take 1 tablet by mouth nightly as needed for Sleep    Continue Lexapro.  Try 50 mg of trazodone, and try half a tablet first and then if she does not sleep well enough to go up to the whole tablet.  If she feels like she needs more than that she can call and let me know but we will go slowly.  Follow-up in December for AWV.  Melinda Thompson MD

## 2023-04-03 ENCOUNTER — OFFICE VISIT (OUTPATIENT)
Dept: FAMILY MEDICINE CLINIC | Facility: CLINIC | Age: 73
End: 2023-04-03
Payer: MEDICARE

## 2023-04-03 VITALS
SYSTOLIC BLOOD PRESSURE: 126 MMHG | DIASTOLIC BLOOD PRESSURE: 63 MMHG | HEIGHT: 62 IN | RESPIRATION RATE: 16 BRPM | HEART RATE: 70 BPM | BODY MASS INDEX: 21.9 KG/M2 | OXYGEN SATURATION: 99 % | WEIGHT: 119 LBS | TEMPERATURE: 97.2 F

## 2023-04-03 DIAGNOSIS — H00.019 HORDEOLUM EXTERNUM, UNSPECIFIED LATERALITY: Primary | ICD-10-CM

## 2023-04-03 DIAGNOSIS — H10.13 ALLERGIC CONJUNCTIVITIS OF BOTH EYES: ICD-10-CM

## 2023-04-03 PROCEDURE — G8399 PT W/DXA RESULTS DOCUMENT: HCPCS | Performed by: FAMILY MEDICINE

## 2023-04-03 PROCEDURE — 3017F COLORECTAL CA SCREEN DOC REV: CPT | Performed by: FAMILY MEDICINE

## 2023-04-03 PROCEDURE — 1123F ACP DISCUSS/DSCN MKR DOCD: CPT | Performed by: FAMILY MEDICINE

## 2023-04-03 PROCEDURE — 99213 OFFICE O/P EST LOW 20 MIN: CPT | Performed by: FAMILY MEDICINE

## 2023-04-03 PROCEDURE — 1090F PRES/ABSN URINE INCON ASSESS: CPT | Performed by: FAMILY MEDICINE

## 2023-04-03 PROCEDURE — G8420 CALC BMI NORM PARAMETERS: HCPCS | Performed by: FAMILY MEDICINE

## 2023-04-03 PROCEDURE — G8427 DOCREV CUR MEDS BY ELIG CLIN: HCPCS | Performed by: FAMILY MEDICINE

## 2023-04-03 PROCEDURE — 1036F TOBACCO NON-USER: CPT | Performed by: FAMILY MEDICINE

## 2023-04-03 RX ORDER — AZELASTINE HYDROCHLORIDE 0.5 MG/ML
SOLUTION/ DROPS OPHTHALMIC
Qty: 1 EACH | Refills: 3 | Status: SHIPPED | OUTPATIENT
Start: 2023-04-03

## 2023-04-03 NOTE — PROGRESS NOTES
Supple, no masses or thyromegaly. Lungs: clear to auscultation bilaterally. CV: regular rate and rhythm, without murmurs, rubs, or gallops  Ext: No lower extremity edema. Hilario Mcginnis was seen today for eye pain. Diagnoses and all orders for this visit:    Hordeolum externum, unspecified laterality    Allergic conjunctivitis of both eyes  -     azelastine (OPTIVAR) 0.05 % ophthalmic solution; One drop to each eye tid x 5 days      Optivar drops now for eye allergies. Baby shampoo eyelid scrubs and warm compresses and gentle massage to the stye. If she is not markedly better by Friday she is to let us know.   Deepa Mercado MD

## 2023-04-26 ENCOUNTER — PATIENT MESSAGE (OUTPATIENT)
Dept: FAMILY MEDICINE CLINIC | Facility: CLINIC | Age: 73
End: 2023-04-26

## 2023-04-27 RX ORDER — FLUCONAZOLE 150 MG/1
150 TABLET ORAL DAILY
Qty: 3 TABLET | Refills: 0 | Status: SHIPPED | OUTPATIENT
Start: 2023-04-27 | End: 2023-04-30

## 2023-04-27 NOTE — TELEPHONE ENCOUNTER
From: Abimbola Grajedaoach  To: Dr. Geraldine Magallon: 4/26/2023 9:15 PM EDT  Subject: Yeast infection    Could I please have a prescription called in for a yeast infection with a refill?  Thank you, Geri Tripp

## 2023-04-27 NOTE — TELEPHONE ENCOUNTER
Sent in prescription for:     Requested Prescriptions     Signed Prescriptions Disp Refills    fluconazole (DIFLUCAN) 150 MG tablet 3 tablet 0     Sig: Take 1 tablet by mouth daily for 3 days     Authorizing Provider: Brigido Alba

## 2023-11-30 SDOH — HEALTH STABILITY: PHYSICAL HEALTH: ON AVERAGE, HOW MANY MINUTES DO YOU ENGAGE IN EXERCISE AT THIS LEVEL?: 20 MIN

## 2023-11-30 SDOH — HEALTH STABILITY: PHYSICAL HEALTH: ON AVERAGE, HOW MANY DAYS PER WEEK DO YOU ENGAGE IN MODERATE TO STRENUOUS EXERCISE (LIKE A BRISK WALK)?: 7 DAYS

## 2023-11-30 ASSESSMENT — PATIENT HEALTH QUESTIONNAIRE - PHQ9
SUM OF ALL RESPONSES TO PHQ QUESTIONS 1-9: 0
2. FEELING DOWN, DEPRESSED OR HOPELESS: 0
SUM OF ALL RESPONSES TO PHQ9 QUESTIONS 1 & 2: 0
1. LITTLE INTEREST OR PLEASURE IN DOING THINGS: 0

## 2023-11-30 ASSESSMENT — LIFESTYLE VARIABLES
HOW OFTEN DO YOU HAVE SIX OR MORE DRINKS ON ONE OCCASION: 1
HOW OFTEN DO YOU HAVE A DRINK CONTAINING ALCOHOL: NEVER
HOW MANY STANDARD DRINKS CONTAINING ALCOHOL DO YOU HAVE ON A TYPICAL DAY: PATIENT DOES NOT DRINK
HOW OFTEN DO YOU HAVE A DRINK CONTAINING ALCOHOL: 1
HOW MANY STANDARD DRINKS CONTAINING ALCOHOL DO YOU HAVE ON A TYPICAL DAY: 0

## 2023-12-04 ENCOUNTER — TRANSCRIBE ORDERS (OUTPATIENT)
Dept: SCHEDULING | Age: 73
End: 2023-12-04

## 2023-12-04 DIAGNOSIS — Z12.31 ENCOUNTER FOR SCREENING MAMMOGRAM FOR BREAST CANCER: Primary | ICD-10-CM

## 2023-12-06 ENCOUNTER — NURSE ONLY (OUTPATIENT)
Dept: FAMILY MEDICINE CLINIC | Facility: CLINIC | Age: 73
End: 2023-12-06

## 2023-12-06 DIAGNOSIS — R53.82 CHRONIC FATIGUE: ICD-10-CM

## 2023-12-06 DIAGNOSIS — Z13.0 SCREENING FOR BLOOD DISEASE: ICD-10-CM

## 2023-12-06 DIAGNOSIS — Z13.1 ENCOUNTER FOR SCREENING FOR DIABETES MELLITUS: ICD-10-CM

## 2023-12-06 DIAGNOSIS — Z13.0 SCREENING FOR BLOOD DISEASE: Primary | ICD-10-CM

## 2023-12-06 LAB
ALBUMIN SERPL-MCNC: 3.6 G/DL (ref 3.2–4.6)
ALBUMIN/GLOB SERPL: 1.1 (ref 0.4–1.6)
ALP SERPL-CCNC: 55 U/L (ref 50–136)
ALT SERPL-CCNC: 33 U/L (ref 12–65)
ANION GAP SERPL CALC-SCNC: NORMAL MMOL/L (ref 2–11)
AST SERPL-CCNC: 20 U/L (ref 15–37)
BASOPHILS # BLD: 0 K/UL (ref 0–0.2)
BASOPHILS NFR BLD: 1 % (ref 0–2)
BILIRUB SERPL-MCNC: 0.3 MG/DL (ref 0.2–1.1)
BUN SERPL-MCNC: 19 MG/DL (ref 8–23)
CALCIUM SERPL-MCNC: 9.9 MG/DL (ref 8.3–10.4)
CHLORIDE SERPL-SCNC: 109 MMOL/L (ref 103–113)
CO2 SERPL-SCNC: 30 MMOL/L (ref 21–32)
CREAT SERPL-MCNC: 0.8 MG/DL (ref 0.6–1)
DIFFERENTIAL METHOD BLD: ABNORMAL
EOSINOPHIL # BLD: 0.4 K/UL (ref 0–0.8)
EOSINOPHIL NFR BLD: 9 % (ref 0.5–7.8)
ERYTHROCYTE [DISTWIDTH] IN BLOOD BY AUTOMATED COUNT: 13.9 % (ref 11.9–14.6)
GLOBULIN SER CALC-MCNC: 3.3 G/DL (ref 2.8–4.5)
GLUCOSE SERPL-MCNC: 87 MG/DL (ref 65–100)
HCT VFR BLD AUTO: 37.5 % (ref 35.8–46.3)
HGB BLD-MCNC: 12.1 G/DL (ref 11.7–15.4)
IMM GRANULOCYTES # BLD AUTO: 0 K/UL (ref 0–0.5)
IMM GRANULOCYTES NFR BLD AUTO: 0 % (ref 0–5)
LYMPHOCYTES # BLD: 1.5 K/UL (ref 0.5–4.6)
LYMPHOCYTES NFR BLD: 37 % (ref 13–44)
MCH RBC QN AUTO: 30.8 PG (ref 26.1–32.9)
MCHC RBC AUTO-ENTMCNC: 32.3 G/DL (ref 31.4–35)
MCV RBC AUTO: 95.4 FL (ref 82–102)
MONOCYTES # BLD: 0.3 K/UL (ref 0.1–1.3)
MONOCYTES NFR BLD: 8 % (ref 4–12)
NEUTS SEG # BLD: 1.8 K/UL (ref 1.7–8.2)
NEUTS SEG NFR BLD: 45 % (ref 43–78)
NRBC # BLD: 0 K/UL (ref 0–0.2)
PLATELET # BLD AUTO: 311 K/UL (ref 150–450)
PMV BLD AUTO: 9.2 FL (ref 9.4–12.3)
POTASSIUM SERPL-SCNC: 4.4 MMOL/L (ref 3.5–5.1)
PROT SERPL-MCNC: 6.9 G/DL (ref 6.3–8.2)
RBC # BLD AUTO: 3.93 M/UL (ref 4.05–5.2)
SODIUM SERPL-SCNC: 138 MMOL/L (ref 136–146)
TSH, 3RD GENERATION: 1.34 UIU/ML (ref 0.36–3.74)
WBC # BLD AUTO: 4.1 K/UL (ref 4.3–11.1)

## 2023-12-12 RX ORDER — VALACYCLOVIR HYDROCHLORIDE 500 MG/1
500 TABLET, FILM COATED ORAL DAILY
Qty: 90 TABLET | Refills: 3 | Status: CANCELLED | OUTPATIENT
Start: 2023-12-12

## 2023-12-13 ENCOUNTER — OFFICE VISIT (OUTPATIENT)
Dept: FAMILY MEDICINE CLINIC | Facility: CLINIC | Age: 73
End: 2023-12-13
Payer: MEDICARE

## 2023-12-13 VITALS
HEIGHT: 62 IN | SYSTOLIC BLOOD PRESSURE: 122 MMHG | RESPIRATION RATE: 16 BRPM | BODY MASS INDEX: 20.61 KG/M2 | HEART RATE: 82 BPM | DIASTOLIC BLOOD PRESSURE: 68 MMHG | OXYGEN SATURATION: 98 % | WEIGHT: 112 LBS | TEMPERATURE: 97.2 F

## 2023-12-13 DIAGNOSIS — M85.88 OSTEOPENIA OF LUMBAR SPINE: ICD-10-CM

## 2023-12-13 DIAGNOSIS — F51.01 PRIMARY INSOMNIA: ICD-10-CM

## 2023-12-13 DIAGNOSIS — Z78.0 POST-MENOPAUSAL: ICD-10-CM

## 2023-12-13 DIAGNOSIS — Z78.0 MENOPAUSE: ICD-10-CM

## 2023-12-13 DIAGNOSIS — M54.32 LEFT SIDED SCIATICA: ICD-10-CM

## 2023-12-13 DIAGNOSIS — Z29.11 NEED FOR RSV IMMUNIZATION: ICD-10-CM

## 2023-12-13 DIAGNOSIS — H10.13 ALLERGIC CONJUNCTIVITIS OF BOTH EYES: ICD-10-CM

## 2023-12-13 DIAGNOSIS — F41.9 ANXIETY: ICD-10-CM

## 2023-12-13 DIAGNOSIS — Z00.00 MEDICARE ANNUAL WELLNESS VISIT, SUBSEQUENT: Primary | ICD-10-CM

## 2023-12-13 DIAGNOSIS — B00.9 HERPES: ICD-10-CM

## 2023-12-13 DIAGNOSIS — Z88.9 H/O SEASONAL ALLERGIES: ICD-10-CM

## 2023-12-13 PROCEDURE — 1123F ACP DISCUSS/DSCN MKR DOCD: CPT | Performed by: FAMILY MEDICINE

## 2023-12-13 PROCEDURE — G0439 PPPS, SUBSEQ VISIT: HCPCS | Performed by: FAMILY MEDICINE

## 2023-12-13 PROCEDURE — 99214 OFFICE O/P EST MOD 30 MIN: CPT | Performed by: FAMILY MEDICINE

## 2023-12-13 PROCEDURE — 3017F COLORECTAL CA SCREEN DOC REV: CPT | Performed by: FAMILY MEDICINE

## 2023-12-13 PROCEDURE — 1090F PRES/ABSN URINE INCON ASSESS: CPT | Performed by: FAMILY MEDICINE

## 2023-12-13 PROCEDURE — G8427 DOCREV CUR MEDS BY ELIG CLIN: HCPCS | Performed by: FAMILY MEDICINE

## 2023-12-13 PROCEDURE — 1036F TOBACCO NON-USER: CPT | Performed by: FAMILY MEDICINE

## 2023-12-13 PROCEDURE — G8484 FLU IMMUNIZE NO ADMIN: HCPCS | Performed by: FAMILY MEDICINE

## 2023-12-13 PROCEDURE — G8420 CALC BMI NORM PARAMETERS: HCPCS | Performed by: FAMILY MEDICINE

## 2023-12-13 PROCEDURE — G8399 PT W/DXA RESULTS DOCUMENT: HCPCS | Performed by: FAMILY MEDICINE

## 2023-12-13 RX ORDER — AZELASTINE HYDROCHLORIDE 0.5 MG/ML
SOLUTION/ DROPS OPHTHALMIC
Qty: 1 EACH | Refills: 3 | Status: SHIPPED | OUTPATIENT
Start: 2023-12-13

## 2023-12-13 RX ORDER — TRAZODONE HYDROCHLORIDE 50 MG/1
50 TABLET ORAL NIGHTLY PRN
Qty: 90 TABLET | Refills: 3 | Status: SHIPPED | OUTPATIENT
Start: 2023-12-13

## 2023-12-13 RX ORDER — ESCITALOPRAM OXALATE 10 MG/1
10 TABLET ORAL DAILY
Qty: 90 TABLET | Refills: 3 | Status: SHIPPED | OUTPATIENT
Start: 2023-12-13

## 2023-12-13 RX ORDER — CYCLOBENZAPRINE HCL 10 MG
TABLET ORAL
Qty: 30 TABLET | Refills: 1 | Status: SHIPPED | OUTPATIENT
Start: 2023-12-13

## 2023-12-13 RX ORDER — ESTRADIOL 0.1 MG/G
CREAM VAGINAL
Qty: 42.5 G | Refills: 5 | Status: SHIPPED | OUTPATIENT
Start: 2023-12-13

## 2023-12-13 NOTE — PROGRESS NOTES
Medicare Annual Wellness Visit    Eduardo Art is here for Medicare AWV    Assessment & Plan   Medicare annual wellness visit, subsequent  Allergic conjunctivitis of both eyes  -     azelastine (OPTIVAR) 0.05 % ophthalmic solution; One drop to each eye tid x 5 days, Disp-1 each, R-3Normal  Left sided sciatica  -     cyclobenzaprine (FLEXERIL) 10 MG tablet; Qhs prn, Disp-30 tablet, R-1Normal  Anxiety  -     escitalopram (LEXAPRO) 10 MG tablet; Take 1 tablet by mouth daily, Disp-90 tablet, R-3Normal  Post-menopausal  -     estradiol (ESTRACE) 0.1 MG/GM vaginal cream; 2 g daily for 7 days then twice a week, Disp-42.5 g, R-5Normal  Primary insomnia  -     traZODone (DESYREL) 50 MG tablet; Take 1 tablet by mouth nightly as needed for Sleep, Disp-90 tablet, R-3Normal  Herpes  Osteopenia of lumbar spine  Menopause  -     DEXA BONE DENSITY AXIAL SKELETON; Future  Need for RSV immunization  -     respiratory syncytial vaccine, adjuvanted (AREXVY) 120 MCG/0.5ML injection; Inject 0.5 mLs into the muscle once for 1 dose, Disp-0.5 mL, R-0Normal  H/O seasonal allergies    Recommendations for Preventive Services Due: see orders and patient instructions/AVS.  Recommended screening schedule for the next 5-10 years is provided to the patient in written form: see Patient Instructions/AVS.     Return in about 1 year (around 12/13/2024). Subjective   The following acute and/or chronic problems were also addressed today:  She has noticed some weight loss. She says she just does not have as much of an appetite as she used to. She feels well overall. Not having any chest pain or shortness of breath. She works out regularly and does not have any problems with that. She does believe she has some whitecoat hypertension as her blood pressure tends to be up a little bit when she comes to the doctor. She occasionally has trouble with left-sided sciatica. She uses Flexeril for when needed.   She also has a history of osteopenia and

## 2024-01-29 ENCOUNTER — HOSPITAL ENCOUNTER (OUTPATIENT)
Dept: MAMMOGRAPHY | Age: 74
Discharge: HOME | End: 2024-02-01
Attending: FAMILY MEDICINE
Payer: MEDICARE

## 2024-01-29 DIAGNOSIS — Z78.0 MENOPAUSE: ICD-10-CM

## 2024-01-29 PROCEDURE — 77080 DXA BONE DENSITY AXIAL: CPT

## 2024-02-01 NOTE — RESULT ENCOUNTER NOTE
Let patient know her bone density showed osteopenia, but not osteoporosis. Be sure to take 500 mg of calcium daily and get weight bearing exercise as that helps build bone.

## 2024-02-02 ENCOUNTER — HOSPITAL ENCOUNTER (OUTPATIENT)
Dept: MAMMOGRAPHY | Age: 74
End: 2024-02-02
Attending: FAMILY MEDICINE
Payer: MEDICARE

## 2024-02-02 DIAGNOSIS — Z12.31 ENCOUNTER FOR SCREENING MAMMOGRAM FOR BREAST CANCER: ICD-10-CM

## 2024-02-02 PROCEDURE — 77063 BREAST TOMOSYNTHESIS BI: CPT

## 2024-03-27 ENCOUNTER — OFFICE VISIT (OUTPATIENT)
Dept: FAMILY MEDICINE CLINIC | Facility: CLINIC | Age: 74
End: 2024-03-27

## 2024-03-27 VITALS
BODY MASS INDEX: 21.16 KG/M2 | HEART RATE: 67 BPM | TEMPERATURE: 97.7 F | SYSTOLIC BLOOD PRESSURE: 138 MMHG | RESPIRATION RATE: 16 BRPM | WEIGHT: 115 LBS | HEIGHT: 62 IN | DIASTOLIC BLOOD PRESSURE: 58 MMHG | OXYGEN SATURATION: 100 %

## 2024-03-27 DIAGNOSIS — M25.511 CHRONIC RIGHT SHOULDER PAIN: ICD-10-CM

## 2024-03-27 DIAGNOSIS — J01.00 ACUTE NON-RECURRENT MAXILLARY SINUSITIS: Primary | ICD-10-CM

## 2024-03-27 DIAGNOSIS — B37.31 YEAST VAGINITIS: ICD-10-CM

## 2024-03-27 DIAGNOSIS — G89.29 CHRONIC RIGHT SHOULDER PAIN: ICD-10-CM

## 2024-03-27 RX ORDER — FLUCONAZOLE 150 MG/1
TABLET ORAL
Qty: 2 TABLET | Refills: 2 | Status: SHIPPED | OUTPATIENT
Start: 2024-03-27

## 2024-03-27 RX ORDER — CEFUROXIME AXETIL 500 MG/1
500 TABLET ORAL 2 TIMES DAILY
Qty: 20 TABLET | Refills: 0 | Status: SHIPPED | OUTPATIENT
Start: 2024-03-27 | End: 2024-04-06

## 2024-03-27 RX ORDER — UBIDECARENONE 75 MG
50 CAPSULE ORAL DAILY
COMMUNITY

## 2024-03-27 RX ORDER — NAPROXEN 500 MG/1
500 TABLET ORAL 2 TIMES DAILY WITH MEALS
Qty: 60 TABLET | Refills: 1 | Status: SHIPPED | OUTPATIENT
Start: 2024-03-27

## 2024-03-27 SDOH — ECONOMIC STABILITY: FOOD INSECURITY: WITHIN THE PAST 12 MONTHS, YOU WORRIED THAT YOUR FOOD WOULD RUN OUT BEFORE YOU GOT MONEY TO BUY MORE.: NEVER TRUE

## 2024-03-27 SDOH — ECONOMIC STABILITY: FOOD INSECURITY: WITHIN THE PAST 12 MONTHS, THE FOOD YOU BOUGHT JUST DIDN'T LAST AND YOU DIDN'T HAVE MONEY TO GET MORE.: NEVER TRUE

## 2024-03-27 SDOH — ECONOMIC STABILITY: INCOME INSECURITY: HOW HARD IS IT FOR YOU TO PAY FOR THE VERY BASICS LIKE FOOD, HOUSING, MEDICAL CARE, AND HEATING?: NOT HARD AT ALL

## 2024-03-27 ASSESSMENT — PATIENT HEALTH QUESTIONNAIRE - PHQ9
SUM OF ALL RESPONSES TO PHQ QUESTIONS 1-9: 0
1. LITTLE INTEREST OR PLEASURE IN DOING THINGS: NOT AT ALL
2. FEELING DOWN, DEPRESSED OR HOPELESS: NOT AT ALL
SUM OF ALL RESPONSES TO PHQ QUESTIONS 1-9: 0
SUM OF ALL RESPONSES TO PHQ QUESTIONS 1-9: 0
SUM OF ALL RESPONSES TO PHQ9 QUESTIONS 1 & 2: 0
SUM OF ALL RESPONSES TO PHQ QUESTIONS 1-9: 0

## 2024-03-27 NOTE — PROGRESS NOTES
[facility] [place] [address]  Phone: [phone] Fax [phone]  Melinda Thompson MD  3/27/2024           Ms. Noguera  is a 74 y.o.  year old  female patient who comes in complaining of a long history of nasal congestion and head pressure and ear pain.  She has been blowing green-yellow discharge out of her nose.  She has not been short of breath or had fever.  She has a lot of facial pressure as well.  She has not been antibiotics in a long time.     She also has a slight itchy vaginal discharge has been going on for about a week.  She also has been having trouble with    Right shoulder pain for several weeks.  It actually hurts in the front of her shoulder down into her bicep region.  She has not injured herself in any way.  She does do a lot of vacuuming with her arm.  She does lie on that arm as well.    Ms. Noguera  has  has a past medical history of H/O seasonal allergies, Herpes, Insomnia, and Osteopenia.    Ms. Noguera  has  has a past surgical history that includes heent; other surgical history; cyst removal; Breast biopsy (Right, 2016); Hysterectomy (age 30); and Cataract extraction (Bilateral).    Ms. Noguera   Current Outpatient Medications   Medication Sig Dispense Refill    vitamin B-12 (CYANOCOBALAMIN) 100 MCG tablet Take 0.5 tablets by mouth daily      cefUROXime (CEFTIN) 500 MG tablet Take 1 tablet by mouth 2 times daily for 10 days 20 tablet 0    naproxen (NAPROSYN) 500 MG tablet Take 1 tablet by mouth 2 times daily (with meals) 60 tablet 1    fluconazole (DIFLUCAN) 150 MG tablet One po now one in 7 days 2 tablet 2    azelastine (OPTIVAR) 0.05 % ophthalmic solution One drop to each eye tid x 5 days 1 each 3    cyclobenzaprine (FLEXERIL) 10 MG tablet Qhs prn 30 tablet 1    escitalopram (LEXAPRO) 10 MG tablet Take 1 tablet by mouth daily 90 tablet 3    estradiol (ESTRACE) 0.1 MG/GM vaginal cream 2 g daily for 7 days then twice a week 42.5 g 5

## 2024-04-15 ENCOUNTER — HOSPITAL ENCOUNTER (OUTPATIENT)
Dept: PHYSICAL THERAPY | Age: 74
Setting detail: RECURRING SERIES
Discharge: HOME | End: 2024-04-18
Payer: MEDICARE

## 2024-04-15 DIAGNOSIS — M25.511 RIGHT SHOULDER PAIN, UNSPECIFIED CHRONICITY: Primary | ICD-10-CM

## 2024-04-15 DIAGNOSIS — M25.611 DECREASED ROM OF RIGHT SHOULDER: ICD-10-CM

## 2024-04-15 DIAGNOSIS — M62.81 MUSCLE WEAKNESS (GENERALIZED): ICD-10-CM

## 2024-04-15 PROCEDURE — 97140 MANUAL THERAPY 1/> REGIONS: CPT

## 2024-04-15 PROCEDURE — 97161 PT EVAL LOW COMPLEX 20 MIN: CPT

## 2024-04-15 PROCEDURE — 97110 THERAPEUTIC EXERCISES: CPT

## 2024-04-15 ASSESSMENT — PAIN SCALES - GENERAL: PAINLEVEL_OUTOF10: 3

## 2024-04-15 NOTE — THERAPY EVALUATION
Initial Assessment:  Ms. Noguera is a 74 y.o. female presenting to physical therapy with signs and symptoms of a right shoulder dysfunction. Patient presents with decreased scapular and glenohumeral mobility, specifically inferior and posterior glenohumeral mobility. Patient presents with increased pain, decreased strength, decreased ROM, decreased flexibility, impaired gait, impaired posture, impaired overhead reach, impaired transfer ability, decreased activity tolerance, and overall impaired functional mobility. Patient is a good candidate for skilled physical therapy interventions to include manual therapy, therapeutic exercise, balance training, gait training, transfer training, postural re-education, body mechanics training, and pain modalities and trigger point dry needling as needed.     Therapy Problem List: (Impacting functional limitations):    Increased Pain, Decreased Strength, Decreased ROM, Decreased Transfer Abilities, Decreased Ambulation Ability/Technique, Decreased Functional Mobility, Decreased [place] with Home Exercise Program, Decreased Posture, Decreased Body Mechanics, Difficulty Sleeping, Decreased Activity Tolerance/Endurance*, Decreased Pacing Skills, Decreased High-Level IADLs, and Decreased ADL Status   Therapy Prognosis:   Good     Initial Assessment Complexity:   Low Complexity       PLAN   Effective Dates: 4/15/2024 TO Plan of Care/Certification Expiration Date: 06/14/24 (Plan of Care Start Date 4/15/2024)     Frequency/Duration: Plan Frequency: 2 times per week for 60 days      Interventions Planned (Treatment may consist of any combination of the following):    Bed Mobility, Functional Mobility Training, Gait Training, Home Exercise Program (HEP), Manual Therapy, Neuromuscular Re-education/Strengthening, Pain Management, Positioning, Modalities:,   Heat/Cold,   Ultrasound,   E-stim - unattended, and   Vasopneumatic Device, Range of Motion (ROM), Return to Work-Related

## 2024-04-15 NOTE — PROGRESS NOTES
Khadijah Grajedaoach  : 1950  Primary: Medicare Part A And B (Medicare)  Secondary: [facility] [employer] INS MEDICARE SUPP [facility] [place]  [address]  [facility] A  [facility] [address]  Phone: [phone]  Fax: [phone] Plan Frequency: 2 times per week for 60 days  Plan of Care/Certification Expiration Date: 24 (Plan of Care Start Date 4/15/2024)        Plan of Care/Certification Expiration Date:  Plan of Care/Certification Expiration Date: 24 (Plan of Care Start Date 4/15/2024)    Frequency/Duration: Plan Frequency: 2 times per week for 60 days      Time In/Out:   Time In: 1000  Time Out: 1100      PT Visit Info:         Visit Count:  1    OUTPATIENT PHYSICAL THERAPY:   Treatment Note 4/15/2024       Episode  (Right Shoulder Pain and Limited ROM)               Treatment Diagnosis:    Right shoulder pain, unspecified chronicity  Decreased ROM of right shoulder  Muscle weakness (generalized)  Medical/Referring Diagnosis:    Chronic right shoulder pain    Referring Physician:  Melinda Thompson MD MD Orders:  PT Eval and Treat   Return MD Appt:  2024   Date of Onset:  Onset Date: 04/15/23     Allergies:   Latex, Ciprofloxacin, Clarithromycin, and Penicillins  Restrictions/Precautions:   Anxiety, Osteopenia       Interventions Planned (Treatment may consist of any combination of the following):     See Assessment Note    Subjective Comments:   Patient reports that she is having pain in her right shoulder in the front and back of his right shoulder.  Initial Pain Level::     3/10  Post Session Pain Level:       /10  Medications Last Reviewed:  4/15/2024  Updated Objective Findings:  See Evaluation Note from today  Treatment   THERAPEUTIC EXERCISE: (23 minutes):    Exercises per grid below to improve mobility, strength, and coordination.  Required moderate visual, verbal, manual, and tactile cues to promote proper body alignment, promote proper body

## 2024-04-18 ENCOUNTER — HOSPITAL ENCOUNTER (OUTPATIENT)
Dept: PHYSICAL THERAPY | Age: 74
Setting detail: RECURRING SERIES
Discharge: HOME | End: 2024-04-21
Payer: MEDICARE

## 2024-04-18 PROCEDURE — 97140 MANUAL THERAPY 1/> REGIONS: CPT

## 2024-04-18 PROCEDURE — 97110 THERAPEUTIC EXERCISES: CPT

## 2024-04-18 ASSESSMENT — PAIN SCALES - GENERAL: PAINLEVEL_OUTOF10: 3

## 2024-04-18 NOTE — PROGRESS NOTES
Provider Department [place]   4/24/2024  1:30 PM Rocky Ramirez, PT [facility]   4/26/2024  2:30 PM Rocky Ramirez, PT [facility]   4/29/2024  1:30 PM Rocky Ramirez, PT [facility]   5/1/2024  1:30 PM Rocky Ramirez, PT [facility]   5/6/2024  1:30 PM Rocky Ramirez, PT [facility]   5/8/2024  1:30 PM Rocky Ramirez, PT [facility]   12/10/2024  8:45 AM FPA MISCELLANEOUS [facility]   12/17/2024 10:00 AM Melinda Thompson MD [facility]

## 2024-04-24 ENCOUNTER — HOSPITAL ENCOUNTER (OUTPATIENT)
Dept: PHYSICAL THERAPY | Age: 74
Setting detail: RECURRING SERIES
Discharge: HOME | End: 2024-04-27
Payer: MEDICARE

## 2024-04-24 PROCEDURE — 97140 MANUAL THERAPY 1/> REGIONS: CPT

## 2024-04-24 PROCEDURE — 97110 THERAPEUTIC EXERCISES: CPT

## 2024-04-24 ASSESSMENT — PAIN SCALES - GENERAL: PAINLEVEL_OUTOF10: 0

## 2024-04-24 NOTE — PROGRESS NOTES
Out: 1430    Rocky Ramirez, PT         Charge Capture  [employer] [facility] Portal  Appt Desk     Future Appointments   Date Time Provider Department Center   4/26/2024  2:30 PM Rocky Ramirez, PT [facility]   4/29/2024  1:30 PM Rocky Ramirez, PT [facility]   5/1/2024  1:30 PM Rocky Ramirez, PT [facility]   5/6/2024  1:30 PM Rocky Ramirez, PT [facility]   5/8/2024  1:30 PM Rocky Ramirez, PT [facility]   12/10/2024  8:45 AM FPA MISCELLANEOUS [facility]   12/17/2024 10:00 AM Melinda Thompson MD [facility]

## 2024-04-26 ENCOUNTER — HOSPITAL ENCOUNTER (OUTPATIENT)
Dept: PHYSICAL THERAPY | Age: 74
Setting detail: RECURRING SERIES
Discharge: HOME | End: 2024-04-29
Payer: MEDICARE

## 2024-04-26 PROCEDURE — 97110 THERAPEUTIC EXERCISES: CPT

## 2024-04-26 PROCEDURE — 97140 MANUAL THERAPY 1/> REGIONS: CPT

## 2024-04-26 ASSESSMENT — PAIN SCALES - GENERAL: PAINLEVEL_OUTOF10: 0

## 2024-04-26 NOTE — PROGRESS NOTES
Khadijah Grajedaoach  : 1950  Primary: Medicare Part A And B (Medicare)  Secondary: [facility] [employer] INS MEDICARE SUPP [facility] [place]  [address]  [facility] A  [facility] [address]  Phone: [phone]  Fax: [phone] Plan Frequency: 2 times per week for 60 days  Plan of Care/Certification Expiration Date: 24 (Plan of Care Start Date 4/15/2024)        Plan of Care/Certification Expiration Date:  Plan of Care/Certification Expiration Date: 24 (Plan of Care Start Date 4/15/2024)    Frequency/Duration: Plan Frequency: 2 times per week for 60 days      Time In/Out:   Time In: 1430  Time Out: 1526      PT Visit Info:         Visit Count:  4    OUTPATIENT PHYSICAL THERAPY:   Treatment Note 2024       Episode  (Right Shoulder Pain and Limited ROM)               Treatment Diagnosis:    Right shoulder pain, unspecified chronicity  Decreased ROM of right shoulder  Muscle weakness (generalized)  Medical/Referring Diagnosis:    Chronic right shoulder pain    Referring Physician:  Melinda Thompson MD MD Orders:  PT Eval and Treat   Return MD Appt:  2024   Date of Onset:  Onset Date: 04/15/23     Allergies:   Latex, Ciprofloxacin, Clarithromycin, and Penicillins  Restrictions/Precautions:   Anxiety, Osteopenia       Interventions Planned (Treatment may consist of any combination of the following):     See Assessment Note    Subjective Comments:   Patient reports that she is not having any pain, but did have some workout muscle soreness after last session.  Initial Pain Level::     0/10  Post Session Pain Level:       0/10  Medications Last Reviewed:  2024  Updated Objective Findings:   Mobility: Scapular Hypomobility All Directions  Treatment   THERAPEUTIC EXERCISE: (38 minutes):    Exercises per grid below to improve mobility, strength, and coordination.  Required moderate visual, verbal, manual, and tactile cues to promote proper body alignment,

## 2024-04-29 ENCOUNTER — HOSPITAL ENCOUNTER (OUTPATIENT)
Dept: PHYSICAL THERAPY | Age: 74
Setting detail: RECURRING SERIES
Discharge: HOME | End: 2024-05-02
Payer: MEDICARE

## 2024-04-29 PROCEDURE — 97140 MANUAL THERAPY 1/> REGIONS: CPT

## 2024-04-29 PROCEDURE — 97110 THERAPEUTIC EXERCISES: CPT

## 2024-04-29 ASSESSMENT — PAIN SCALES - GENERAL: PAINLEVEL_OUTOF10: 0

## 2024-04-29 NOTE — PROGRESS NOTES
Khadijah Grajedaoach  : 1950  Primary: Medicare Part A And B (Medicare)  Secondary: [facility] [employer] LIFE INS MEDICARE SUPP [facility] [place]  [address]  [facility] [[email or web address] A  [facility] [address]  Phone: [phone]  Fax: [phone] Plan Frequency: 2 times per week for 60 days  Plan of Care/Certification Expiration Date: 24 (Plan of Care Start Date 4/15/2024)        Plan of Care/Certification Expiration Date:  Plan of Care/Certification Expiration Date: 24 (Plan of Care Start Date 4/15/2024)    Frequency/Duration: Plan Frequency: 2 times per week for 60 days      Time In/Out:   Time In: 1325  Time Out: 1423      PT Visit Info:         Visit Count:  5    OUTPATIENT PHYSICAL THERAPY:   Treatment Note 2024       Episode  (Right Shoulder Pain and Limited ROM)               Treatment Diagnosis:    Right shoulder pain, unspecified chronicity  Decreased ROM of right shoulder  Muscle weakness (generalized)  Medical/Referring Diagnosis:    Chronic right shoulder pain    Referring Physician:  Melinda Thompson MD MD Orders:  PT Eval and Treat   Return MD Appt:  2024   Date of Onset:  Onset Date: 04/15/23     Allergies:   Latex, Ciprofloxacin, Clarithromycin, and Penicillins  Restrictions/Precautions:   Anxiety, Osteopenia       Interventions Planned (Treatment may consist of any combination of the following):     See Assessment Note    Subjective Comments:   Patient reports that she is not having any pain, but did again have some workout muscle soreness after last session.  Initial Pain Level::     0/10  Post Session Pain Level:       0/10  Medications Last Reviewed:  2024  Updated Objective Findings:   Mobility: Scapular Hypomobility All Directions  Treatment   THERAPEUTIC EXERCISE: (38 minutes):    Exercises per grid below to improve mobility, strength, and coordination.  Required moderate visual, verbal, manual, and tactile cues to promote proper body

## 2024-05-01 ENCOUNTER — HOSPITAL ENCOUNTER (OUTPATIENT)
Dept: PHYSICAL THERAPY | Age: 74
Setting detail: RECURRING SERIES
Discharge: HOME | End: 2024-05-04
Payer: MEDICARE

## 2024-05-01 PROCEDURE — 97110 THERAPEUTIC EXERCISES: CPT

## 2024-05-01 PROCEDURE — 97140 MANUAL THERAPY 1/> REGIONS: CPT

## 2024-05-01 NOTE — PROGRESS NOTES
from continued progression postural awareness and strength to progress functionally.  Communication/Consultation:   HEP  Equipment provided today:  HEP  Recommendations/Intent for next treatment session: Next visit will focus on decreasing pain, improving mobility, flexibility, ROM, strength, and function.    >Total Treatment Billable Duration:  53 minutes  Time In: 1330  Time Out: 1427    Rocky Ramirez PT         Charge Capture  [employer] Portal  Appt Desk     Future Appointments   Date Time Provider Department Center   5/6/2024  1:30 PM Rocky Ramirez PT [facility]   5/8/2024  1:30 PM Rocky Ramirez PT [facility]   12/10/2024  8:45 AM FPA MISCELLANEOUS [facility]   12/17/2024 10:00 AM Melinda Thompson MD [facility]

## 2024-05-06 ENCOUNTER — HOSPITAL ENCOUNTER (OUTPATIENT)
Dept: PHYSICAL THERAPY | Age: 74
Setting detail: RECURRING SERIES
Discharge: HOME | End: 2024-05-09
Payer: MEDICARE

## 2024-05-06 PROCEDURE — 97110 THERAPEUTIC EXERCISES: CPT

## 2024-05-06 PROCEDURE — 97140 MANUAL THERAPY 1/> REGIONS: CPT

## 2024-05-06 ASSESSMENT — PAIN SCALES - GENERAL: PAINLEVEL_OUTOF10: 0

## 2024-05-06 NOTE — PROGRESS NOTES
Khadijah Grajedaoach  : 1950  Primary: Medicare Part A And B (Medicare)  Secondary: [facility] [employer] INS MEDICARE SUPP [facility] [place]  [address]  [facility]  [facility] [address]  Phone: [phone]  Fax: [phone] Plan Frequency: 2 times per week for 60 days  Plan of Care/Certification Expiration Date: 24 (Plan of Care Start Date 4/15/2024)        Plan of Care/Certification Expiration Date:  Plan of Care/Certification Expiration Date: 24 (Plan of Care Start Date 4/15/2024)    Frequency/Duration: Plan Frequency: 2 times per week for 60 days      Time In/Out:   Time In: 1330  Time Out: 1430      PT Visit Info:         Visit Count:  7    OUTPATIENT PHYSICAL THERAPY:   Treatment Note 2024       Episode  (Right Shoulder Pain and Limited ROM)               Treatment Diagnosis:    Right shoulder pain, unspecified chronicity  Decreased ROM of right shoulder  Muscle weakness (generalized)  Medical/Referring Diagnosis:    Chronic right shoulder pain    Referring Physician:  Melinda Thompson MD MD Orders:  PT Eval and Treat   Return MD Appt:  2024   Date of Onset:  Onset Date: 04/15/23     Allergies:   Latex, Ciprofloxacin, Clarithromycin, and Penicillins  Restrictions/Precautions:   Anxiety, Osteopenia       Interventions Planned (Treatment may consist of any combination of the following):     See Assessment Note    Subjective Comments:   Patient reports that her shoulder is feeling good, no pain.  Initial Pain Level::     0/10  Post Session Pain Level:       0/10  Medications Last Reviewed:  2024  Updated Objective Findings:   Mobility: Scapular Hypomobility All Directions  Treatment   THERAPEUTIC EXERCISE: (38 minutes):    Exercises per grid below to improve mobility, strength, and coordination.  Required moderate visual, verbal, manual, and tactile cues to promote proper body alignment, promote proper body posture, promote proper body

## 2024-05-08 ENCOUNTER — HOSPITAL ENCOUNTER (OUTPATIENT)
Dept: PHYSICAL THERAPY | Age: 74
Setting detail: RECURRING SERIES
Discharge: HOME | End: 2024-05-11
Payer: MEDICARE

## 2024-05-08 PROCEDURE — 97110 THERAPEUTIC EXERCISES: CPT

## 2024-05-08 ASSESSMENT — PAIN SCALES - GENERAL: PAINLEVEL_OUTOF10: 0

## 2024-05-08 NOTE — PROGRESS NOTES
Khadijah Grajedaoach  : 1950  Primary: Medicare Part A And B (Medicare)  Secondary: [facility] MED[employer] LIFE INS MEDICARE SUPP [facility] [place]  [address]  [facility] [address]  Phone: [phone]  Fax: [phone] Plan Frequency: 2 times per week for 60 days  Plan of Care/Certification Expiration Date: 24 (Plan of Care Start Date 4/15/2024)        Plan of Care/Certification Expiration Date:  Plan of Care/Certification Expiration Date: 24 (Plan of Care Start Date 4/15/2024)    Frequency/Duration: Plan Frequency: 2 times per week for 60 days      Time In/Out:   Time In: 1327  Time Out: 1422      PT Visit Info:         Visit Count:  8    OUTPATIENT PHYSICAL THERAPY:   Treatment Note 2024       Episode  (Right Shoulder Pain and Limited ROM)               Treatment Diagnosis:    Right shoulder pain, unspecified chronicity  Decreased ROM of right shoulder  Muscle weakness (generalized)  Medical/Referring Diagnosis:    Chronic right shoulder pain    Referring Physician:  Melinda Thompson MD MD Orders:  PT Eval and Treat   Return MD Appt:  2024   Date of Onset:  Onset Date: 04/15/23     Allergies:   Latex, Ciprofloxacin, Clarithromycin, and Penicillins  Restrictions/Precautions:   Anxiety, Osteopenia       Interventions Planned (Treatment may consist of any combination of the following):     See Assessment Note    Subjective Comments:   Patient reports that her shoulder is feeling good, no pain and per patient is ready to continue on her own with her HEP.  Initial Pain Level::     0/10  Post Session Pain Level:       0/10  Medications Last Reviewed:  2024  Updated Objective Findings:  See Discharge Note from today  Treatment   THERAPEUTIC EXERCISE: (38 minutes):    Exercises per grid below to improve mobility, strength, and coordination.  Required moderate visual, verbal, manual, and tactile cues to promote proper body alignment, promote

## 2024-05-08 NOTE — THERAPY DISCHARGE
Khadijah ARDON Poornima  : 1950  Primary: Medicare Part A And B (Medicare)  Secondary: [facility] MED[employer] LIFE INS MEDICARE SUPP [facility] [place]  [address]  [facility]  [facility] [address]  Phone: [phone]  Fax: [phone] Plan Frequency: 2 times per week for 60 days    Plan of Care/Certification Expiration Date: 24 (Plan of Care Start Date 4/15/2024)        Plan of Care/Certification Expiration Date:  Plan of Care/Certification Expiration Date: 24 (Plan of Care Start Date 4/15/2024)    Frequency/Duration: Plan Frequency: 2 times per week for 60 days      Time In/Out:          PT Visit Info:         Visit Count:  8                OUTPATIENT PHYSICAL THERAPY:             Discharge Summary 2024               Episode (Right Shoulder Pain and Limited ROM)         Treatment Diagnosis:     Right shoulder pain, unspecified chronicity  Decreased ROM of right shoulder  Muscle weakness (generalized)  Medical/Referring Diagnosis:    Chronic right shoulder pain    Referring Physician:  Melinda Thompson MD MD Orders:  PT Eval and Treat   Return MD Appt:  2024  Date of Onset:  Onset Date: 04/15/23     Allergies:  Latex, Ciprofloxacin, Clarithromycin, and Penicillins  Restrictions/Precautions:    Anxiety, Osteopenia       Medications Last Reviewed:  2024     Assessment & Plan    OBJECTIVE     Patient denies any headaches, changes in vision, dizziness, vertigo, nausea, drop attacks, black outs, tinnitus, dysphagia, dysarthria, LE symptoms or bowel/bladder dysfunction.    Observation/Orthostatic Postural Assessment:          Patient presents with trace to none (from right forward, protracted right shoulder, right anterior tilt.)    Palpation:          Patient presents with none (from moderate to severe) right biceps, deltoid, right upper/middle trapezius, levator scapula, infraspinatus, supraspinatus, teres major and minor tightness, tenderness, trigger

## 2024-05-28 ENCOUNTER — OFFICE VISIT (OUTPATIENT)
Dept: FAMILY MEDICINE CLINIC | Facility: CLINIC | Age: 74
End: 2024-05-28
Payer: MEDICARE

## 2024-05-28 VITALS
SYSTOLIC BLOOD PRESSURE: 142 MMHG | TEMPERATURE: 97.4 F | WEIGHT: 111.6 LBS | HEART RATE: 70 BPM | RESPIRATION RATE: 16 BRPM | HEIGHT: 62 IN | DIASTOLIC BLOOD PRESSURE: 68 MMHG | OXYGEN SATURATION: 97 % | BODY MASS INDEX: 20.54 KG/M2

## 2024-05-28 DIAGNOSIS — H10.33 ACUTE BACTERIAL CONJUNCTIVITIS OF BOTH EYES: Primary | ICD-10-CM

## 2024-05-28 PROCEDURE — G8427 DOCREV CUR MEDS BY ELIG CLIN: HCPCS | Performed by: PHYSICIAN ASSISTANT

## 2024-05-28 PROCEDURE — 1036F TOBACCO NON-USER: CPT | Performed by: PHYSICIAN ASSISTANT

## 2024-05-28 PROCEDURE — 99213 OFFICE O/P EST LOW 20 MIN: CPT | Performed by: PHYSICIAN ASSISTANT

## 2024-05-28 PROCEDURE — G8420 CALC BMI NORM PARAMETERS: HCPCS | Performed by: PHYSICIAN ASSISTANT

## 2024-05-28 PROCEDURE — 1123F ACP DISCUSS/DSCN MKR DOCD: CPT | Performed by: PHYSICIAN ASSISTANT

## 2024-05-28 PROCEDURE — G8399 PT W/DXA RESULTS DOCUMENT: HCPCS | Performed by: PHYSICIAN ASSISTANT

## 2024-05-28 PROCEDURE — 1090F PRES/ABSN URINE INCON ASSESS: CPT | Performed by: PHYSICIAN ASSISTANT

## 2024-05-28 PROCEDURE — 3017F COLORECTAL CA SCREEN DOC REV: CPT | Performed by: PHYSICIAN ASSISTANT

## 2024-05-28 RX ORDER — TOBRAMYCIN 3 MG/ML
1 SOLUTION/ DROPS OPHTHALMIC EVERY 4 HOURS
Qty: 5 ML | Refills: 0 | Status: SHIPPED | OUTPATIENT
Start: 2024-05-28

## 2024-05-28 ASSESSMENT — ENCOUNTER SYMPTOMS
COUGH: 0
SHORTNESS OF BREATH: 0
EYE DISCHARGE: 1
EYE PAIN: 0
PHOTOPHOBIA: 0
EYE REDNESS: 1

## 2024-05-28 ASSESSMENT — PATIENT HEALTH QUESTIONNAIRE - PHQ9
SUM OF ALL RESPONSES TO PHQ QUESTIONS 1-9: 0
SUM OF ALL RESPONSES TO PHQ QUESTIONS 1-9: 0
SUM OF ALL RESPONSES TO PHQ9 QUESTIONS 1 & 2: 0
2. FEELING DOWN, DEPRESSED OR HOPELESS: NOT AT ALL
SUM OF ALL RESPONSES TO PHQ QUESTIONS 1-9: 0
1. LITTLE INTEREST OR PLEASURE IN DOING THINGS: NOT AT ALL
SUM OF ALL RESPONSES TO PHQ QUESTIONS 1-9: 0

## 2024-05-28 NOTE — PROGRESS NOTES
nursing note reviewed.   Constitutional:       Appearance: Normal appearance. She is not ill-appearing.   HENT:      Head: Normocephalic.   Eyes:      General:         Right eye: No discharge.         Left eye: Discharge present.     Conjunctiva/sclera:      Right eye: Right conjunctiva is injected.      Left eye: Left conjunctiva is injected.   Cardiovascular:      Rate and Rhythm: Normal rate and regular rhythm.      Heart sounds: Normal heart sounds. No murmur heard.  Pulmonary:      Effort: Pulmonary effort is normal.      Breath sounds: Normal breath sounds.   Musculoskeletal:      Cervical back: Neck supple.   Lymphadenopathy:      Cervical: No cervical adenopathy.   Neurological:      Mental Status: She is alert.   Psychiatric:         Mood and Affect: Mood normal.         Behavior: Behavior normal.         Thought Content: Thought content normal.       ASSESSMENT & PLAN    ICD-10-CM    1. Acute bacterial conjunctivitis of both eyes  H10.33 tobramycin (TOBREX) 0.3 % ophthalmic solution           1. Acute bacterial conjunctivitis of both eyes  *To use the tobramycin drops every 4 hours, while awake, for the next 7 days.  Return for any worsening or persistent symptoms.  Handouts regarding conjunctivitis given today.  Wash hands frequently.  Avoid touching eyes as able.  - tobramycin (TOBREX) 0.3 % ophthalmic solution; Place 1 drop into both eyes every 4 hours  Dispense: 5 mL; Refill: 0      No orders of the defined types were placed in this encounter.    I have reviewed the patient's past medical history, social history and family history and vitals.  We have discussed treatment plan and follow up and given patient instructions.  Patient's questions are answered and we will follow up as indicated.    Dictated using voice recognition software.  Proof read but unrecognized errors may exist.    Follow-up and Dispositions    Return As previously scheduled, 12/17/2024, with Dr. Thompson.         Joss Ken

## 2024-12-10 ENCOUNTER — LAB (OUTPATIENT)
Dept: FAMILY MEDICINE CLINIC | Facility: CLINIC | Age: 74
End: 2024-12-10

## 2024-12-10 ENCOUNTER — TELEPHONE (OUTPATIENT)
Dept: FAMILY MEDICINE CLINIC | Facility: CLINIC | Age: 74
End: 2024-12-10

## 2024-12-10 DIAGNOSIS — Z13.0 SCREENING FOR BLOOD DISEASE: ICD-10-CM

## 2024-12-10 DIAGNOSIS — Z13.1 ENCOUNTER FOR SCREENING FOR DIABETES MELLITUS: ICD-10-CM

## 2024-12-10 DIAGNOSIS — Z13.1 ENCOUNTER FOR SCREENING FOR DIABETES MELLITUS: Primary | ICD-10-CM

## 2024-12-10 DIAGNOSIS — Z00.00 MEDICARE ANNUAL WELLNESS VISIT, SUBSEQUENT: ICD-10-CM

## 2024-12-10 DIAGNOSIS — R53.82 CHRONIC FATIGUE: ICD-10-CM

## 2024-12-10 LAB
ALBUMIN SERPL-MCNC: 3.8 G/DL (ref 3.2–4.6)
ALBUMIN/GLOB SERPL: 1.1 (ref 1–1.9)
ALP SERPL-CCNC: 52 U/L (ref 35–104)
ALT SERPL-CCNC: 16 U/L (ref 8–45)
ANION GAP SERPL CALC-SCNC: 8 MMOL/L (ref 7–16)
AST SERPL-CCNC: 27 U/L (ref 15–37)
BASOPHILS # BLD: 0 K/UL (ref 0–0.2)
BASOPHILS NFR BLD: 1 % (ref 0–2)
BILIRUB SERPL-MCNC: 0.3 MG/DL (ref 0–1.2)
BUN SERPL-MCNC: 23 MG/DL (ref 8–23)
CALCIUM SERPL-MCNC: 10.1 MG/DL (ref 8.8–10.2)
CHLORIDE SERPL-SCNC: 105 MMOL/L (ref 98–107)
CO2 SERPL-SCNC: 27 MMOL/L (ref 20–29)
CREAT SERPL-MCNC: 0.83 MG/DL (ref 0.6–1.1)
DIFFERENTIAL METHOD BLD: ABNORMAL
EOSINOPHIL # BLD: 0.2 K/UL (ref 0–0.8)
EOSINOPHIL NFR BLD: 4 % (ref 0.5–7.8)
ERYTHROCYTE [DISTWIDTH] IN BLOOD BY AUTOMATED COUNT: 14 % (ref 11.9–14.6)
GLOBULIN SER CALC-MCNC: 3.4 G/DL (ref 2.3–3.5)
GLUCOSE SERPL-MCNC: 84 MG/DL (ref 70–99)
HCT VFR BLD AUTO: 38.4 % (ref 35.8–46.3)
HGB BLD-MCNC: 12.4 G/DL (ref 11.7–15.4)
IMM GRANULOCYTES # BLD AUTO: 0 K/UL (ref 0–0.5)
IMM GRANULOCYTES NFR BLD AUTO: 0 % (ref 0–5)
LYMPHOCYTES # BLD: 1.7 K/UL (ref 0.5–4.6)
LYMPHOCYTES NFR BLD: 35 % (ref 13–44)
MCH RBC QN AUTO: 30.6 PG (ref 26.1–32.9)
MCHC RBC AUTO-ENTMCNC: 32.3 G/DL (ref 31.4–35)
MCV RBC AUTO: 94.8 FL (ref 82–102)
MONOCYTES # BLD: 0.4 K/UL (ref 0.1–1.3)
MONOCYTES NFR BLD: 8 % (ref 4–12)
NEUTS SEG # BLD: 2.5 K/UL (ref 1.7–8.2)
NEUTS SEG NFR BLD: 52 % (ref 43–78)
NRBC # BLD: 0 K/UL (ref 0–0.2)
PLATELET # BLD AUTO: 319 K/UL (ref 150–450)
PMV BLD AUTO: 9 FL (ref 9.4–12.3)
POTASSIUM SERPL-SCNC: 4.6 MMOL/L (ref 3.5–5.1)
PROT SERPL-MCNC: 7.2 G/DL (ref 6.3–8.2)
RBC # BLD AUTO: 4.05 M/UL (ref 4.05–5.2)
SODIUM SERPL-SCNC: 141 MMOL/L (ref 136–145)
TSH, 3RD GENERATION: 1.85 UIU/ML (ref 0.27–4.2)
WBC # BLD AUTO: 4.8 K/UL (ref 4.3–11.1)

## 2024-12-14 SDOH — HEALTH STABILITY: PHYSICAL HEALTH: ON AVERAGE, HOW MANY DAYS PER WEEK DO YOU ENGAGE IN MODERATE TO STRENUOUS EXERCISE (LIKE A BRISK WALK)?: 3 DAYS

## 2024-12-14 SDOH — HEALTH STABILITY: PHYSICAL HEALTH: ON AVERAGE, HOW MANY MINUTES DO YOU ENGAGE IN EXERCISE AT THIS LEVEL?: 20 MIN

## 2024-12-14 ASSESSMENT — PATIENT HEALTH QUESTIONNAIRE - PHQ9
SUM OF ALL RESPONSES TO PHQ QUESTIONS 1-9: 0
SUM OF ALL RESPONSES TO PHQ QUESTIONS 1-9: 0
1. LITTLE INTEREST OR PLEASURE IN DOING THINGS: NOT AT ALL
SUM OF ALL RESPONSES TO PHQ9 QUESTIONS 1 & 2: 0
2. FEELING DOWN, DEPRESSED OR HOPELESS: NOT AT ALL
SUM OF ALL RESPONSES TO PHQ QUESTIONS 1-9: 0
SUM OF ALL RESPONSES TO PHQ QUESTIONS 1-9: 0

## 2024-12-14 ASSESSMENT — LIFESTYLE VARIABLES
HOW OFTEN DO YOU HAVE A DRINK CONTAINING ALCOHOL: 1
HOW MANY STANDARD DRINKS CONTAINING ALCOHOL DO YOU HAVE ON A TYPICAL DAY: PATIENT DOES NOT DRINK
HOW OFTEN DO YOU HAVE SIX OR MORE DRINKS ON ONE OCCASION: 1
HOW OFTEN DO YOU HAVE A DRINK CONTAINING ALCOHOL: NEVER
HOW MANY STANDARD DRINKS CONTAINING ALCOHOL DO YOU HAVE ON A TYPICAL DAY: 0

## 2024-12-17 ENCOUNTER — OFFICE VISIT (OUTPATIENT)
Dept: FAMILY MEDICINE CLINIC | Facility: CLINIC | Age: 74
End: 2024-12-17

## 2024-12-17 VITALS
SYSTOLIC BLOOD PRESSURE: 136 MMHG | OXYGEN SATURATION: 99 % | RESPIRATION RATE: 16 BRPM | BODY MASS INDEX: 20.8 KG/M2 | TEMPERATURE: 97.1 F | HEART RATE: 65 BPM | DIASTOLIC BLOOD PRESSURE: 65 MMHG | HEIGHT: 62 IN | WEIGHT: 113 LBS

## 2024-12-17 DIAGNOSIS — Z88.9 H/O SEASONAL ALLERGIES: ICD-10-CM

## 2024-12-17 DIAGNOSIS — M25.511 CHRONIC RIGHT SHOULDER PAIN: ICD-10-CM

## 2024-12-17 DIAGNOSIS — G89.29 CHRONIC RIGHT SHOULDER PAIN: ICD-10-CM

## 2024-12-17 DIAGNOSIS — B00.9 HERPES: ICD-10-CM

## 2024-12-17 DIAGNOSIS — B37.31 YEAST VAGINITIS: ICD-10-CM

## 2024-12-17 DIAGNOSIS — Z78.0 POST-MENOPAUSAL: ICD-10-CM

## 2024-12-17 DIAGNOSIS — F51.01 PRIMARY INSOMNIA: ICD-10-CM

## 2024-12-17 DIAGNOSIS — M54.32 LEFT SIDED SCIATICA: ICD-10-CM

## 2024-12-17 DIAGNOSIS — H10.13 ALLERGIC CONJUNCTIVITIS OF BOTH EYES: ICD-10-CM

## 2024-12-17 DIAGNOSIS — F41.9 ANXIETY: ICD-10-CM

## 2024-12-17 DIAGNOSIS — Z00.00 MEDICARE ANNUAL WELLNESS VISIT, SUBSEQUENT: Primary | ICD-10-CM

## 2024-12-17 RX ORDER — TRAZODONE HYDROCHLORIDE 50 MG/1
50 TABLET, FILM COATED ORAL NIGHTLY PRN
Qty: 90 TABLET | Refills: 3 | Status: SHIPPED | OUTPATIENT
Start: 2024-12-17

## 2024-12-17 RX ORDER — NAPROXEN 500 MG/1
500 TABLET ORAL 2 TIMES DAILY WITH MEALS
Qty: 60 TABLET | Refills: 1 | Status: CANCELLED | OUTPATIENT
Start: 2024-12-17

## 2024-12-17 RX ORDER — FLUCONAZOLE 150 MG/1
TABLET ORAL
Qty: 2 TABLET | Refills: 2 | Status: SHIPPED | OUTPATIENT
Start: 2024-12-17

## 2024-12-17 RX ORDER — AZELASTINE HYDROCHLORIDE 0.5 MG/ML
SOLUTION/ DROPS OPHTHALMIC
Qty: 1 EACH | Refills: 3 | Status: SHIPPED | OUTPATIENT
Start: 2024-12-17

## 2024-12-17 RX ORDER — ESTRADIOL 0.1 MG/G
CREAM VAGINAL
Qty: 42.5 G | Refills: 5 | Status: SHIPPED | OUTPATIENT
Start: 2024-12-17

## 2024-12-17 RX ORDER — VALACYCLOVIR HYDROCHLORIDE 500 MG/1
500 TABLET, FILM COATED ORAL DAILY
Qty: 90 TABLET | Refills: 3 | Status: CANCELLED | OUTPATIENT
Start: 2024-12-17

## 2024-12-17 RX ORDER — ESCITALOPRAM OXALATE 10 MG/1
10 TABLET ORAL DAILY
Qty: 90 TABLET | Refills: 3 | Status: SHIPPED | OUTPATIENT
Start: 2024-12-17

## 2024-12-17 RX ORDER — CYCLOBENZAPRINE HCL 10 MG
TABLET ORAL
Qty: 30 TABLET | Refills: 1 | Status: SHIPPED | OUTPATIENT
Start: 2024-12-17

## 2024-12-17 NOTE — PATIENT INSTRUCTIONS
Eating Healthy Foods: Care Instructions  With every meal, you can make healthy food choices. Try to eat a variety of fruits, vegetables, whole grains, lean proteins, and low-fat dairy products. This can help you get the right balance of nutrients, including vitamins and minerals. Small changes add up over time. You can start by adding one healthy food to your meals each day.    Try to make half your plate fruits and vegetables, one-fourth whole grains, and one-fourth lean proteins. Try including dairy with your meals.   Eat more fruits and vegetables. Try to have them with most meals and snacks.   Foods for healthy eating        Fruits   These can be fresh, frozen, canned, or dried.  Try to choose whole fruit rather than fruit juice.  Eat a variety of colors.        Vegetables   These can be fresh, frozen, canned, or dried.  Beans, peas, and lentils count too.        Whole grains   Choose whole-grain breads, cereals, and noodles.  Try brown rice.        Lean proteins   These can include lean meat, poultry, fish, and eggs.  You can also have tofu, beans, peas, lentils, nuts, and seeds.        Dairy   Try milk, yogurt, and cheese.  Choose low-fat or fat-free when you can.  If you need to, use lactose-free milk or fortified plant-based milk products, such as soy milk.        Water   Drink water when you're thirsty.  Limit sugar-sweetened drinks, including soda, fruit drinks, and sports drinks.  Where can you learn more?  Go to https://www.[employer].net/patientEd and enter T756 to learn more about \"Eating Healthy Foods: Care Instructions.\"  Current as of: September 20, 2023  Content Version: 14.2  © 2024 [employer].   Care instructions adapted under license by [employer]. If you have questions about a medical condition or this instruction, always ask your healthcare professional. [employer] disclaims any warranty or liability for your use of this information.           A Healthy Heart:

## 2024-12-17 NOTE — PROGRESS NOTES
Medicare Annual Wellness Visit    Khadijah Noguera is here for Medicare AWV    Assessment & Plan   Medicare annual wellness visit, subsequent  Allergic conjunctivitis of both eyes  -     azelastine (OPTIVAR) 0.05 % ophthalmic solution; One drop to each eye tid x 5 days, Disp-1 each, R-3Normal  Anxiety  -     escitalopram (LEXAPRO) 10 MG tablet; Take 1 tablet by mouth daily, Disp-90 tablet, R-3Normal  Left sided sciatica  -     cyclobenzaprine (FLEXERIL) 10 MG tablet; Qhs prn, Disp-30 tablet, R-1Normal  Post-menopausal  -     estradiol (ESTRACE) 0.1 MG/GM vaginal cream; 2 g daily for 7 days then twice a week, Disp-42.5 g, R-5Normal  Yeast vaginitis  -     fluconazole (DIFLUCAN) 150 MG tablet; One po now one in 7 days, Disp-2 tablet, R-2Normal  Chronic right shoulder pain  Primary insomnia  -     traZODone (DESYREL) 50 MG tablet; Take 1 tablet by mouth nightly as needed for Sleep, Disp-90 tablet, R-3Normal  Herpes  H/O seasonal allergies  -     azelastine (OPTIVAR) 0.05 % ophthalmic solution; One drop to each eye tid x 5 days, Disp-1 each, R-3Normal    Recommendations for Preventive Services Due: see orders and patient instructions/AVS.  Recommended screening schedule for the next 5-10 years is provided to the patient in written form: see Patient Instructions/AVS.     Return in about 1 year (around 12/17/2025).     Subjective    She occasionally has trouble with left-sided sciatica.  She uses Flexeril for when needed.  She will continue this.      She does have anxiety and takes Lexapro and that works well for her.  She will continue this.      She does take trazodone for sleep.  She will continue this    She also takes valacyclovir for fever blisters and is doing well with this.  She will continue it.     She takes Optivar for eye allergies.  She will continue this.      she also uses estrogen vaginal cream when needed.  She will continue this.      She uses Zyrtec over-the-counter for allergies.  She has had a lot of

## 2025-01-06 ENCOUNTER — TRANSCRIBE ORDERS (OUTPATIENT)
Dept: SCHEDULING | Age: 75
End: 2025-01-06

## 2025-01-06 DIAGNOSIS — Z12.31 OTHER SCREENING MAMMOGRAM: Primary | ICD-10-CM

## 2025-02-07 ENCOUNTER — HOSPITAL ENCOUNTER (OUTPATIENT)
Dept: MAMMOGRAPHY | Age: 75
Discharge: HOME | End: 2025-02-10
Attending: FAMILY MEDICINE
Payer: MEDICARE

## 2025-02-07 VITALS — WEIGHT: 112 LBS | BODY MASS INDEX: 20.61 KG/M2 | HEIGHT: 62 IN

## 2025-02-07 DIAGNOSIS — Z12.31 OTHER SCREENING MAMMOGRAM: ICD-10-CM

## 2025-02-07 PROCEDURE — 77063 BREAST TOMOSYNTHESIS BI: CPT

## 2025-09-03 ENCOUNTER — OFFICE VISIT (OUTPATIENT)
Dept: FAMILY MEDICINE CLINIC | Facility: CLINIC | Age: 75
End: 2025-09-03

## 2025-09-03 VITALS
HEART RATE: 77 BPM | TEMPERATURE: 97 F | BODY MASS INDEX: 20.98 KG/M2 | OXYGEN SATURATION: 97 % | DIASTOLIC BLOOD PRESSURE: 60 MMHG | HEIGHT: 62 IN | SYSTOLIC BLOOD PRESSURE: 127 MMHG | RESPIRATION RATE: 16 BRPM | WEIGHT: 114 LBS

## 2025-09-03 DIAGNOSIS — N30.00 ACUTE CYSTITIS WITHOUT HEMATURIA: ICD-10-CM

## 2025-09-03 DIAGNOSIS — R35.0 URINARY FREQUENCY: Primary | ICD-10-CM

## 2025-09-03 LAB
BILIRUBIN, URINE, POC: NEGATIVE
BLOOD URINE, POC: NEGATIVE
GLUCOSE URINE, POC: NEGATIVE
KETONES, URINE, POC: NEGATIVE
LEUKOCYTE ESTERASE, URINE, POC: NEGATIVE
NITRITE, URINE, POC: NEGATIVE
PH, URINE, POC: 5 (ref 4.6–8)
PROTEIN,URINE, POC: NORMAL
SPECIFIC GRAVITY, URINE, POC: 1.02 (ref 1–1.03)
URINALYSIS CLARITY, POC: NORMAL
URINALYSIS COLOR, POC: YELLOW
UROBILINOGEN, POC: NORMAL

## 2025-09-03 RX ORDER — SULFAMETHOXAZOLE AND TRIMETHOPRIM 800; 160 MG/1; MG/1
TABLET ORAL
COMMUNITY
Start: 2025-08-28 | End: 2025-09-03

## 2025-09-03 SDOH — ECONOMIC STABILITY: FOOD INSECURITY: WITHIN THE PAST 12 MONTHS, YOU WORRIED THAT YOUR FOOD WOULD RUN OUT BEFORE YOU GOT MONEY TO BUY MORE.: NEVER TRUE

## 2025-09-03 SDOH — ECONOMIC STABILITY: FOOD INSECURITY: WITHIN THE PAST 12 MONTHS, THE FOOD YOU BOUGHT JUST DIDN'T LAST AND YOU DIDN'T HAVE MONEY TO GET MORE.: NEVER TRUE

## 2025-09-03 ASSESSMENT — PATIENT HEALTH QUESTIONNAIRE - PHQ9
SUM OF ALL RESPONSES TO PHQ QUESTIONS 1-9: 0
2. FEELING DOWN, DEPRESSED OR HOPELESS: NOT AT ALL
SUM OF ALL RESPONSES TO PHQ QUESTIONS 1-9: 0
1. LITTLE INTEREST OR PLEASURE IN DOING THINGS: NOT AT ALL
SUM OF ALL RESPONSES TO PHQ QUESTIONS 1-9: 0
SUM OF ALL RESPONSES TO PHQ QUESTIONS 1-9: 0